# Patient Record
Sex: FEMALE | Employment: FULL TIME | URBAN - METROPOLITAN AREA
[De-identification: names, ages, dates, MRNs, and addresses within clinical notes are randomized per-mention and may not be internally consistent; named-entity substitution may affect disease eponyms.]

---

## 2022-11-16 ENCOUNTER — APPOINTMENT (OUTPATIENT)
Dept: LAB | Facility: CLINIC | Age: 32
End: 2022-11-16

## 2022-11-16 DIAGNOSIS — Z36.89 ENCOUNTER FOR OTHER SPECIFIED ANTENATAL SCREENING: ICD-10-CM

## 2022-11-16 DIAGNOSIS — Z31.430 ENCNTR FEM FOR TEST FOR GENETC DIS CARRIER STAT FOR PRO MGMT: ICD-10-CM

## 2022-11-16 LAB
BASOPHILS # BLD AUTO: 0.03 THOUSANDS/ÂΜL (ref 0–0.1)
BASOPHILS NFR BLD AUTO: 1 % (ref 0–1)
EOSINOPHIL # BLD AUTO: 0.02 THOUSAND/ÂΜL (ref 0–0.61)
EOSINOPHIL NFR BLD AUTO: 0 % (ref 0–6)
ERYTHROCYTE [DISTWIDTH] IN BLOOD BY AUTOMATED COUNT: 11.6 % (ref 11.6–15.1)
GLUCOSE SERPL-MCNC: 82 MG/DL (ref 65–140)
HBV SURFACE AG SER QL: NORMAL
HCT VFR BLD AUTO: 36.9 % (ref 34.8–46.1)
HCV AB SER QL: NORMAL
HGB BLD-MCNC: 12.1 G/DL (ref 11.5–15.4)
IMM GRANULOCYTES # BLD AUTO: 0.01 THOUSAND/UL (ref 0–0.2)
IMM GRANULOCYTES NFR BLD AUTO: 0 % (ref 0–2)
LYMPHOCYTES # BLD AUTO: 1.07 THOUSANDS/ÂΜL (ref 0.6–4.47)
LYMPHOCYTES NFR BLD AUTO: 23 % (ref 14–44)
MCH RBC QN AUTO: 27.6 PG (ref 26.8–34.3)
MCHC RBC AUTO-ENTMCNC: 32.8 G/DL (ref 31.4–37.4)
MCV RBC AUTO: 84 FL (ref 82–98)
MONOCYTES # BLD AUTO: 0.22 THOUSAND/ÂΜL (ref 0.17–1.22)
MONOCYTES NFR BLD AUTO: 5 % (ref 4–12)
NEUTROPHILS # BLD AUTO: 3.32 THOUSANDS/ÂΜL (ref 1.85–7.62)
NEUTS SEG NFR BLD AUTO: 71 % (ref 43–75)
NRBC BLD AUTO-RTO: 0 /100 WBCS
PLATELET # BLD AUTO: 262 THOUSANDS/UL (ref 149–390)
PMV BLD AUTO: 10.6 FL (ref 8.9–12.7)
RBC # BLD AUTO: 4.39 MILLION/UL (ref 3.81–5.12)
RUBV IGG SERPL IA-ACNC: >175 IU/ML
VZV IGG SER QL IA: NORMAL
WBC # BLD AUTO: 4.67 THOUSAND/UL (ref 4.31–10.16)

## 2022-11-17 LAB
HIV 1+2 AB+HIV1 P24 AG SERPL QL IA: NORMAL
RPR SER QL: NORMAL

## 2022-11-18 LAB
BACTERIA UR CULT: ABNORMAL
BACTERIA UR CULT: ABNORMAL

## 2022-11-22 LAB
CLINICAL INFO: NORMAL
ETHNIC BACKGROUND STATED: NORMAL
GENE MUT TESTED BLD/T: NORMAL
GENERAL COMMENTS:: NORMAL
LAB DIRECTOR NAME PROVIDER: NORMAL
REASON FOR REFERRAL (NARRATIVE): NORMAL
REF LAB TEST METHOD: NORMAL
SL AMB DISCLAIMER: NORMAL
SL AMB GENETIC COUNSELOR: NORMAL
SMN1 GENE MUT ANL BLD/T: NORMAL
SPECIMEN SOURCE: NORMAL

## 2022-11-23 LAB
EXTERNAL CHLAMYDIA SCREEN: NEGATIVE
EXTERNAL GONORRHEA SCREEN: NEGATIVE

## 2022-11-25 LAB
CF COMMENT: NORMAL
CFTR MUT ANL BLD/T: NORMAL
COMMENTX: (FRAGILE X): NORMAL
FMR1 GENE CGG RPT BLD/T QL: NORMAL

## 2022-12-20 LAB — EXTERNAL AFP: NORMAL

## 2023-03-15 LAB — GLUCOSE 1H P 50 G GLC PO SERPL-MCNC: 139 MG/DL (ref 70–183)

## 2023-05-30 ENCOUNTER — HOSPITAL ENCOUNTER (INPATIENT)
Facility: HOSPITAL | Age: 33
LOS: 3 days | Discharge: HOME | End: 2023-06-02
Attending: OBSTETRICS & GYNECOLOGY | Admitting: OBSTETRICS & GYNECOLOGY
Payer: COMMERCIAL

## 2023-05-30 ENCOUNTER — ANESTHESIA EVENT (INPATIENT)
Dept: LABOR AND DELIVERY | Facility: HOSPITAL | Age: 33
End: 2023-05-30

## 2023-05-30 ENCOUNTER — ANESTHESIA (INPATIENT)
Dept: LABOR AND DELIVERY | Facility: HOSPITAL | Age: 33
End: 2023-05-30

## 2023-05-30 DIAGNOSIS — Z98.891 S/P PRIMARY LOW TRANSVERSE C-SECTION: Primary | ICD-10-CM

## 2023-05-30 PROBLEM — Z3A.39 39 WEEKS GESTATION OF PREGNANCY: Status: ACTIVE | Noted: 2023-05-30

## 2023-05-30 PROBLEM — O42.92 FULL-TERM PREMATURE RUPTURE OF MEMBRANES: Status: ACTIVE | Noted: 2023-05-30

## 2023-05-30 PROBLEM — F41.9 ANXIETY: Status: ACTIVE | Noted: 2023-05-30

## 2023-05-30 PROBLEM — N80.9 ENDOMETRIOSIS: Status: ACTIVE | Noted: 2023-05-30

## 2023-05-30 LAB
A1 MICROGLOB PLACENTAL VAG QL: POSITIVE
ABO GROUP BLD: NORMAL
BLD GP AB SCN SERPL QL: NEGATIVE
ERYTHROCYTE [DISTWIDTH] IN BLOOD BY AUTOMATED COUNT: 13.4 % (ref 11.6–15.1)
HCT VFR BLD AUTO: 36.5 % (ref 34.8–46.1)
HGB BLD-MCNC: 11.7 G/DL (ref 11.5–15.4)
HOLD SPECIMEN: NORMAL
MCH RBC QN AUTO: 27.3 PG (ref 26.8–34.3)
MCHC RBC AUTO-ENTMCNC: 32.1 G/DL (ref 31.4–37.4)
MCV RBC AUTO: 85 FL (ref 82–98)
PLATELET # BLD AUTO: 228 THOUSANDS/UL (ref 149–390)
PMV BLD AUTO: 10.2 FL (ref 8.9–12.7)
RBC # BLD AUTO: 4.28 MILLION/UL (ref 3.81–5.12)
RH BLD: POSITIVE
SPECIMEN EXPIRATION DATE: NORMAL
TREPONEMA PALLIDUM IGG+IGM AB [PRESENCE] IN SERUM OR PLASMA BY IMMUNOASSAY: NORMAL
WBC # BLD AUTO: 10.49 THOUSAND/UL (ref 4.31–10.16)

## 2023-05-30 PROCEDURE — 86900 BLOOD TYPING SEROLOGIC ABO: CPT

## 2023-05-30 PROCEDURE — 3E033VJ INTRODUCTION OF OTHER HORMONE INTO PERIPHERAL VEIN, PERCUTANEOUS APPROACH: ICD-10-PCS | Performed by: OBSTETRICS & GYNECOLOGY

## 2023-05-30 PROCEDURE — 4A1HXCZ MONITORING OF PRODUCTS OF CONCEPTION, CARDIAC RATE, EXTERNAL APPROACH: ICD-10-PCS | Performed by: OBSTETRICS & GYNECOLOGY

## 2023-05-30 PROCEDURE — NC001 PR NO CHARGE: Performed by: OBSTETRICS & GYNECOLOGY

## 2023-05-30 PROCEDURE — 86780 TREPONEMA PALLIDUM: CPT

## 2023-05-30 PROCEDURE — 86850 RBC ANTIBODY SCREEN: CPT

## 2023-05-30 PROCEDURE — 3E0DXGC INTRODUCTION OF OTHER THERAPEUTIC SUBSTANCE INTO MOUTH AND PHARYNX, EXTERNAL APPROACH: ICD-10-PCS | Performed by: OBSTETRICS & GYNECOLOGY

## 2023-05-30 PROCEDURE — 85027 COMPLETE CBC AUTOMATED: CPT

## 2023-05-30 PROCEDURE — 84112 EVAL AMNIOTIC FLUID PROTEIN: CPT

## 2023-05-30 PROCEDURE — 86901 BLOOD TYPING SEROLOGIC RH(D): CPT

## 2023-05-30 RX ORDER — BUPIVACAINE HYDROCHLORIDE 2.5 MG/ML
30 INJECTION, SOLUTION EPIDURAL; INFILTRATION; INTRACAUDAL ONCE AS NEEDED
Status: DISCONTINUED | OUTPATIENT
Start: 2023-05-30 | End: 2023-05-31

## 2023-05-30 RX ORDER — LIDOCAINE HYDROCHLORIDE AND EPINEPHRINE 15; 5 MG/ML; UG/ML
INJECTION, SOLUTION EPIDURAL AS NEEDED
Status: DISCONTINUED | OUTPATIENT
Start: 2023-05-30 | End: 2023-05-31

## 2023-05-30 RX ORDER — ONDANSETRON 2 MG/ML
4 INJECTION INTRAMUSCULAR; INTRAVENOUS EVERY 6 HOURS PRN
Status: DISCONTINUED | OUTPATIENT
Start: 2023-05-30 | End: 2023-05-31

## 2023-05-30 RX ORDER — BUTORPHANOL TARTRATE 1 MG/ML
1 INJECTION, SOLUTION INTRAMUSCULAR; INTRAVENOUS ONCE
Status: COMPLETED | OUTPATIENT
Start: 2023-05-30 | End: 2023-05-30

## 2023-05-30 RX ORDER — ROPIVACAINE HYDROCHLORIDE 2 MG/ML
INJECTION, SOLUTION EPIDURAL; INFILTRATION; PERINEURAL CONTINUOUS PRN
Status: DISCONTINUED | OUTPATIENT
Start: 2023-05-30 | End: 2023-05-31

## 2023-05-30 RX ORDER — SODIUM CHLORIDE, SODIUM LACTATE, POTASSIUM CHLORIDE, CALCIUM CHLORIDE 600; 310; 30; 20 MG/100ML; MG/100ML; MG/100ML; MG/100ML
125 INJECTION, SOLUTION INTRAVENOUS CONTINUOUS
Status: DISCONTINUED | OUTPATIENT
Start: 2023-05-30 | End: 2023-06-02 | Stop reason: HOSPADM

## 2023-05-30 RX ORDER — OXYTOCIN/RINGER'S LACTATE 30/500 ML
1-30 PLASTIC BAG, INJECTION (ML) INTRAVENOUS
Status: DISCONTINUED | OUTPATIENT
Start: 2023-05-30 | End: 2023-05-31

## 2023-05-30 RX ORDER — ROPIVACAINE HYDROCHLORIDE 5 MG/ML
INJECTION, SOLUTION EPIDURAL; INFILTRATION; PERINEURAL AS NEEDED
Status: DISCONTINUED | OUTPATIENT
Start: 2023-05-30 | End: 2023-05-31

## 2023-05-30 RX ADMIN — ROPIVACAINE HYDROCHLORIDE: 2 INJECTION, SOLUTION EPIDURAL; INFILTRATION at 19:00

## 2023-05-30 RX ADMIN — LIDOCAINE HYDROCHLORIDE AND EPINEPHRINE 3 ML: 15; 5 INJECTION, SOLUTION EPIDURAL at 18:55

## 2023-05-30 RX ADMIN — SODIUM CHLORIDE, SODIUM LACTATE, POTASSIUM CHLORIDE, AND CALCIUM CHLORIDE 125 ML/HR: .6; .31; .03; .02 INJECTION, SOLUTION INTRAVENOUS at 21:39

## 2023-05-30 RX ADMIN — ROPIVACAINE HYDROCHLORIDE: 2 INJECTION, SOLUTION EPIDURAL; INFILTRATION at 21:41

## 2023-05-30 RX ADMIN — ONDANSETRON 4 MG: 2 INJECTION INTRAMUSCULAR; INTRAVENOUS at 19:45

## 2023-05-30 RX ADMIN — ROPIVACAINE HYDROCHLORIDE 8 ML/HR: 2 INJECTION, SOLUTION EPIDURAL; INFILTRATION at 18:57

## 2023-05-30 RX ADMIN — SODIUM CHLORIDE, SODIUM LACTATE, POTASSIUM CHLORIDE, AND CALCIUM CHLORIDE 125 ML/HR: .6; .31; .03; .02 INJECTION, SOLUTION INTRAVENOUS at 18:27

## 2023-05-30 RX ADMIN — BUTORPHANOL TARTRATE 1 MG: 1 INJECTION, SOLUTION INTRAMUSCULAR; INTRAVENOUS at 16:18

## 2023-05-30 RX ADMIN — ROPIVACAINE HYDROCHLORIDE 4 ML: 5 INJECTION EPIDURAL; INFILTRATION; PERINEURAL at 18:57

## 2023-05-30 RX ADMIN — SODIUM CHLORIDE, SODIUM LACTATE, POTASSIUM CHLORIDE, AND CALCIUM CHLORIDE 999 ML/HR: .6; .31; .03; .02 INJECTION, SOLUTION INTRAVENOUS at 17:23

## 2023-05-30 RX ADMIN — OXYTOCIN 2 MILLI-UNITS/MIN: 10 INJECTION INTRAVENOUS at 17:26

## 2023-05-30 RX ADMIN — Medication 50 MCG: at 13:19

## 2023-05-30 NOTE — OB LABOR/OXYTOCIN SAFETY PROGRESS
Oxytocin Safety Progress Check Note - Sameera Chi 28 y o  female MRN: [id number]    Unit/Bed#: L&D 322-01 Encounter: [id number]    Dose (ezequiel-units/min) Oxytocin: 4 ezequiel-units/min  Contraction Frequency (minutes): 2-5  Contraction Quality: Mild  Tachysystole: No   Cervical Dilation: 5        Cervical Effacement: 80  Fetal Station: -2  Baseline Rate: 140 bpm  Fetal Heart Rate: 150 BPM  FHR Category: Category I           Vital Signs:   Vitals:    05/30/23 1759   BP: 125/71   Pulse: 79   Resp:    Temp:        Notes/comments: Tolerating ctx better with stadol on board  Taught forebag palpated  Continue pit titration  Epidural as desired          Jia Merida DO 5/30/2023 6:08 PM

## 2023-05-30 NOTE — ASSESSMENT & PLAN NOTE
Pain regimen: scheduled Tylenol;scheduled Motrin; PRN Oxycodone  Bowel regimen: Colace BID  Voiding s/p removal of ca catheter  Encourage ambulation and breastfeeding

## 2023-05-30 NOTE — H&P
H&P Exam - Obstetrics   Sameera Chi 28 y o  female MRN: [id number]  Unit/Bed#: L&D 322-01 Encounter: [id number]      ASSESSMENT:  28 y  o yo  at 39w1d weeks gestation who is being admitted for PROM   EFW: 7lbs  VTX by transabdominal ultrasound    PLAN:    Full-term premature rupture of membranes  Assessment & Plan  PROM at [address] on 23  Equivocal exam: negative nitrazine, ferning and pooling  ROSAURA of 8 77cm  Amnisure positive  Admitted for induction of labor  Anxiety  Assessment & Plan  Stable  Previously taking zoloft and xanax, stopped in pregnancy    * 39 weeks gestation of pregnancy  Assessment & Plan  Admit to L&D  CBC, RPR, Type & Screen  SVE: 0 /-3  FHT: category I, occasional variable decelerations seen earlier on tracing  Clinical EFW: 7lbs ; Vertex confirmed by TAUS  GBS status: negative   Postpartum contraception plan: declines  Analgesia at maternal request  Plan: PO cytotec then pitocin  Discussed with Dr Corinne Cookey      This patient will be an INPATIENT  and I certify the anticipated length of stay is >2 Midnights  History of Present Illness     Chief Complaint: SROM    HPI:  Sameera Chi is a 28 y o   female with an JAYESH of 2023, by Last Menstrual Period at 39w1d weeks gestation who is being admitted for PROM  She states that at 0145 this morning she had a small gush of fluid that soaked her underwear and her bed  She then changed her clothes and sheets and had the same thing happen again  She is unsure if this was her breaking her water or not  Patient endorses vaginal spotting with mucousy discharge  She denies contractions and decreased fetal movement  Patient states her pregnancy has been uncomplicated  Contractions: irregular  Loss of fluid: yes  Vaginal bleeding: minimal w/mucous  Fetal movement: yes    She is SOLO patient       PREGNANCY COMPLICATIONS:   1) Pregnancy at 39w1d  2) Anxiety    OB History    Para Term  AB Living   1 0 0 0 0 0   SAB IAB Ectopic Multiple Live Births   0 0 0 0 0      # Outcome Date GA Lbr Satish/2nd Weight Sex Delivery Anes PTL Lv   1 Current                Baby complications/comments: none    Review of Systems   Constitutional: Negative for chills and fever  HENT: Negative for ear pain and sore throat  Eyes: Negative for pain and visual disturbance  Respiratory: Negative for cough and shortness of breath  Cardiovascular: Negative for chest pain and palpitations  Gastrointestinal: Negative for abdominal pain and vomiting  Genitourinary: Positive for vaginal bleeding and vaginal discharge  Negative for dysuria and hematuria  Musculoskeletal: Negative for arthralgias and back pain  Skin: Negative for color change and rash  Neurological: Negative for seizures and syncope  All other systems reviewed and are negative  Historical Information   Past Medical History:   Diagnosis Date   • Anxiety     previously needing medications   • Endometriosis    • Stomach ulcer 2013     Past Surgical History:   Procedure Laterality Date   • DIAGNOSTIC LAPAROSCOPY      age 12 for endometriosis     Social History   Social History     Substance and Sexual Activity   Alcohol Use Yes    Comment: social     Social History     Substance and Sexual Activity   Drug Use No     Social History     Tobacco Use   Smoking Status Never   Smokeless Tobacco Never     Family History: non-contributory    Meds/Allergies      Medications Prior to Admission   Medication   • ALPRAZolam (XANAX) 0 25 mg tablet   • sertraline (ZOLOFT) 50 mg tablet      No Known Allergies    OBJECTIVE:    Vitals: Last menstrual period 08/29/2022  There is no height or weight on file to calculate BMI  BP: 120s/70s    Physical Exam  Vitals reviewed  Constitutional:       Appearance: Normal appearance  HENT:      Head: Normocephalic and atraumatic  Eyes:      Extraocular Movements: Extraocular movements intact     Cardiovascular:      Rate and Rhythm: "Normal rate  Pulses: Normal pulses  Pulmonary:      Effort: Pulmonary effort is normal  No respiratory distress  Abdominal:      Palpations: Abdomen is soft  Tenderness: There is no abdominal tenderness  Comments: Gravid uterus   Genitourinary:     General: Normal vulva  Musculoskeletal:         General: Normal range of motion  Cervical back: Normal range of motion  Skin:     General: Skin is warm and dry  Neurological:      Mental Status: She is alert  Psychiatric:         Mood and Affect: Mood normal          Behavior: Behavior normal          Pooling: negative  Ferning: negative  Nitrazine: positive  Speculum: blood seen on speculum exam  ROSAURA: 8 77cm  Amnisure: positive    Cervix:  Cervical Dilation: Fingertip  Cervical Effacement: 50  Cervical Consistency: Medium  Fetal Station: -3  Presentation: Vertex  Method: Manual  OB Examiner: Delores    Fetal heart rate:   Baseline Rate: 145 bpm  Variability: Moderate 6-25 bpm  Accelerations: 15 x 15 or greater, At variable times    Lyndon:   Contraction Frequency (minutes): irregular with irritability  Contraction Duration (seconds): 40-90  Contraction Quality: Mild    GBS: negative    Prenatal Labs: I have personally reviewed pertinent reports    , Blood Type:   Lab Results   Component Value Date/Time    ABO Grouping O 10/10/2022 08:19 AM     , D (Rh type):   Lab Results   Component Value Date/Time    Rh Factor Positive 10/10/2022 08:19 AM     , Antibody Screen: No results found for: \"ANTIBODYSCR\" , HCT/HGB:   Lab Results   Component Value Date/Time    Hematocrit 36 9 11/16/2022 07:13 AM    Hemoglobin 12 1 11/16/2022 07:13 AM      , MCV:   Lab Results   Component Value Date/Time    MCV 84 11/16/2022 07:13 AM      , Platelets:   Lab Results   Component Value Date/Time    Platelets 225 22/29/0252 07:13 AM      , 1 hour Glucola:   Lab Results   Component Value Date/Time    Glucose 139 03/15/2023 12:00 AM   , 3 hour GTT:   Lab Results " "  Component Value Date/Time    Glucose, GTT - 3 Hour 115 03/22/2023 11:00 AM   , Varicella:   Lab Results   Component Value Date/Time    Varicella IgG IMMUNE 11/16/2022 07:13 AM       , Rubella:   Lab Results   Component Value Date/Time    Rubella IgG Quant >175 0 11/16/2022 07:13 AM        , VDRL/RPR:   Lab Results   Component Value Date/Time    RPR Non-Reactive 11/16/2022 07:13 AM      , Urine Culture/Screen:   Lab Results   Component Value Date/Time    Urine Culture 30,000-39,000 cfu/ml Lactobacillus species (A) 11/16/2022 07:13 AM    Urine Culture <10,000 cfu/ml 11/16/2022 07:13 AM       , Urine Drug Screen: No results found for: \"AMPHETUR\", \"BARBTUR\", \"BDZUR\", \"COCAINEUR\", \"ECSTASYUR\", \"METHADONEUR\", \"MTHAMUR\", \"OPIATEUR\", \"PCPUR\", \"THCUR\", \"TRICYCLICSUR\", Hep B:   Lab Results   Component Value Date/Time    Hepatitis B Surface Ag Non-reactive 11/16/2022 07:13 AM     , Hep C: No components found for: \"EXTHEPCSAG\", \"HEPCSAG\"   , HIV:   Lab Results   Component Value Date/Time    HIV-1/HIV-2 Ab Non-Reactive 11/16/2022 07:13 AM     , Chlamydia:   Lab Results   Component Value Date/Time    External Chlamydia Screen Negative 11/23/2022 12:00 AM     , Gonorrhea: No results found for: \"LABNGO\"  , Group B Strep:    Lab Results   Component Value Date/Time    Strep Grp B PCR Negative 05/09/2023 01:44 PM          Invasive Devices     None                 Clayton Bajwa MD  OBGYN PGY-2  5/30/2023  10:32 AM  "

## 2023-05-30 NOTE — OB LABOR/OXYTOCIN SAFETY PROGRESS
Labor Progress Note - Torito Brick 28 y o  female MRN: [id number]    Unit/Bed#: L&D 322-01 Encounter: [id number]       Contraction Frequency (minutes): 2-5  Contraction Quality: Mild  Tachysystole: No   Cervical Dilation: 2        Cervical Effacement: 50  Fetal Station: -3  Baseline Rate: 140 bpm  Fetal Heart Rate: 150 BPM  FHR Category: Category I               Vital Signs:   Vitals:    05/30/23 1420   BP: 119/66   Pulse: 73   Resp:    Temp:        Notes/comments:   Evaluated patient 4 hours after cytotec placed  Dawkins balloon in cervical canal, external os now 2cm dilated  Patient received stadol for analgesia  Contractions too frequent for repeat dose cytotec, will plan to start pitocin to continue induction of labor       Discussed with Dr Dayo Blake MD 5/30/2023 4:32 PM

## 2023-05-30 NOTE — OB LABOR/OXYTOCIN SAFETY PROGRESS
Labor Progress Note - Mendoza Frazier 28 y o  female MRN: [id number]    Unit/Bed#: L&D 322-01 Encounter: [id number]       Contraction Frequency (minutes): irritability  Contraction Quality: Mild  Tachysystole: No   Cervical Dilation: 1        Cervical Effacement: 50  Fetal Station: -3  Baseline Rate: 145 bpm  Fetal Heart Rate: 150 BPM   Category II               Vital Signs:   Vitals:    05/30/23 1420   BP: 119/66   Pulse: 73   Resp:    Temp:        Notes/comments:   Evaluated patient after several hours  Starting to have some cramping and discomfort  Discussed with patient risks and benefits of ca balloon placement, including theoretical risk of infection from insertion, but theoretical decreased risk of infection from shortened labor course  Patient is amenable to placement  Ca balloon placed, cervix now 1/50/-3  Category II FHT with occasional variable decelerations  Will continue to monitor      Discussed with Dr Winter Kent MD 5/30/2023 2:46 PM

## 2023-05-30 NOTE — ANESTHESIA PREPROCEDURE EVALUATION
Procedure:  LABOR ANALGESIA    Relevant Problems   GYN   (+) 39 weeks gestation of pregnancy      NEURO/PSYCH   (+) Anxiety      Other   (+) Endometriosis   (+) Full-term premature rupture of membranes        Physical Exam    Airway    Mallampati score: II         Dental   No notable dental hx     Cardiovascular  Cardiovascular exam normal    Pulmonary  Pulmonary exam normal     Other Findings        Anesthesia Plan  ASA Score- 2     Anesthesia Type- epidural with ASA Monitors  Additional Monitors:   Airway Plan:           Plan Factors-Exercise tolerance (METS): >4 METS  Chart reviewed  Existing labs reviewed  Patient is not a current smoker  Induction-     Postoperative Plan-     Informed Consent- Anesthetic plan and risks discussed with patient

## 2023-05-30 NOTE — ANESTHESIA PROCEDURE NOTES
Epidural Block    Patient location during procedure: OB  Start time: 5/30/2023 6:55 PM  Reason for block: primary anesthetic  Staffing  Performed by: Shannen Sanchez MD  Authorized by: Shannen Sanchez MD    Preanesthetic Checklist  Completed: patient identified, IV checked, site marked, risks and benefits discussed, surgical consent, monitors and equipment checked, pre-op evaluation and timeout performed  Epidural  Patient position: sitting  Prep: Betadine  Patient monitoring: frequent blood pressure checks, continuous pulse ox and heart rate  Approach: midline  Location: lumbar  Injection technique: SETH saline  Needle  Needle type: Tuohy   Needle gauge: 18 G  Catheter type: side hole  Catheter size: 20 G  Catheter at skin depth: 12 cm  Catheter securement method: clear occlusive dressing  Test dose: negative  Assessment  Sensory level: T10  Number of attempts: 1no paresthesia on injection, negative aspiration for heme and negative aspiration for CSF  patient tolerated the procedure well with no immediate complications

## 2023-05-31 PROBLEM — O36.8390 FETAL TACHYCARDIA AFFECTING MANAGEMENT OF MOTHER: Status: ACTIVE | Noted: 2023-05-31

## 2023-05-31 LAB
BASE EXCESS BLDCOA CALC-SCNC: -4.5 MMOL/L (ref 3–11)
BASE EXCESS BLDCOV CALC-SCNC: -3.5 MMOL/L (ref 1–9)
HCO3 BLDCOA-SCNC: 22.2 MMOL/L (ref 17.3–27.3)
HCO3 BLDCOV-SCNC: 22.3 MMOL/L (ref 12.2–28.6)
O2 CT VFR BLDCOA CALC: <10 ML/DL
OXYHGB MFR BLDCOA: 9 %
OXYHGB MFR BLDCOV: 38.4 %
PCO2 BLDCOA: 46.7 MM[HG] (ref 30–60)
PCO2 BLDCOV: 42.5 MM HG (ref 27–43)
PH BLDCOA: 7.29 [PH] (ref 7.23–7.43)
PH BLDCOV: 7.34 [PH] (ref 7.19–7.49)
PO2 BLDCOA: <10 MM HG (ref 5–25)
PO2 BLDCOV: 18.8 MM HG (ref 15–45)
SAO2 % BLDCOV: 8.5 ML/DL

## 2023-05-31 PROCEDURE — NC001 PR NO CHARGE: Performed by: OBSTETRICS & GYNECOLOGY

## 2023-05-31 PROCEDURE — 94762 N-INVAS EAR/PLS OXIMTRY CONT: CPT

## 2023-05-31 PROCEDURE — 85027 COMPLETE CBC AUTOMATED: CPT | Performed by: OBSTETRICS & GYNECOLOGY

## 2023-05-31 PROCEDURE — 10H07YZ INSERTION OF OTHER DEVICE INTO PRODUCTS OF CONCEPTION, VIA NATURAL OR ARTIFICIAL OPENING: ICD-10-PCS | Performed by: OBSTETRICS & GYNECOLOGY

## 2023-05-31 PROCEDURE — 82805 BLOOD GASES W/O2 SATURATION: CPT

## 2023-05-31 PROCEDURE — 88307 TISSUE EXAM BY PATHOLOGIST: CPT | Performed by: PATHOLOGY

## 2023-05-31 RX ORDER — OXYTOCIN/RINGER'S LACTATE 30/500 ML
PLASTIC BAG, INJECTION (ML) INTRAVENOUS
Status: COMPLETED
Start: 2023-05-31 | End: 2023-05-31

## 2023-05-31 RX ORDER — MORPHINE SULFATE 0.5 MG/ML
INJECTION, SOLUTION EPIDURAL; INTRATHECAL; INTRAVENOUS AS NEEDED
Status: DISCONTINUED | OUTPATIENT
Start: 2023-05-31 | End: 2023-05-31

## 2023-05-31 RX ORDER — SODIUM CHLORIDE, SODIUM LACTATE, POTASSIUM CHLORIDE, CALCIUM CHLORIDE 600; 310; 30; 20 MG/100ML; MG/100ML; MG/100ML; MG/100ML
125 INJECTION, SOLUTION INTRAVENOUS CONTINUOUS
Status: DISCONTINUED | OUTPATIENT
Start: 2023-05-31 | End: 2023-06-02 | Stop reason: HOSPADM

## 2023-05-31 RX ORDER — IBUPROFEN 600 MG/1
600 TABLET ORAL EVERY 6 HOURS
Status: DISCONTINUED | OUTPATIENT
Start: 2023-06-02 | End: 2023-06-02 | Stop reason: HOSPADM

## 2023-05-31 RX ORDER — NALBUPHINE HYDROCHLORIDE 10 MG/ML
5 INJECTION, SOLUTION INTRAMUSCULAR; INTRAVENOUS; SUBCUTANEOUS
Status: ACTIVE | OUTPATIENT
Start: 2023-05-31 | End: 2023-06-01

## 2023-05-31 RX ORDER — OXYTOCIN/RINGER'S LACTATE 30/500 ML
62.5 PLASTIC BAG, INJECTION (ML) INTRAVENOUS ONCE
Status: COMPLETED | OUTPATIENT
Start: 2023-05-31 | End: 2023-05-31

## 2023-05-31 RX ORDER — NALOXONE HYDROCHLORIDE 0.4 MG/ML
0.1 INJECTION, SOLUTION INTRAMUSCULAR; INTRAVENOUS; SUBCUTANEOUS
Status: ACTIVE | OUTPATIENT
Start: 2023-05-31 | End: 2023-06-01

## 2023-05-31 RX ORDER — OXYCODONE HYDROCHLORIDE 5 MG/1
5 TABLET ORAL EVERY 4 HOURS PRN
Status: DISCONTINUED | OUTPATIENT
Start: 2023-05-31 | End: 2023-06-02 | Stop reason: HOSPADM

## 2023-05-31 RX ORDER — DOCUSATE SODIUM 100 MG/1
100 CAPSULE, LIQUID FILLED ORAL 2 TIMES DAILY
Status: DISCONTINUED | OUTPATIENT
Start: 2023-05-31 | End: 2023-06-02 | Stop reason: HOSPADM

## 2023-05-31 RX ORDER — ACETAMINOPHEN 325 MG/1
650 TABLET ORAL EVERY 6 HOURS SCHEDULED
Status: DISCONTINUED | OUTPATIENT
Start: 2023-05-31 | End: 2023-06-02 | Stop reason: HOSPADM

## 2023-05-31 RX ORDER — CEFAZOLIN SODIUM 1 G/3ML
INJECTION, POWDER, FOR SOLUTION INTRAMUSCULAR; INTRAVENOUS AS NEEDED
Status: DISCONTINUED | OUTPATIENT
Start: 2023-05-31 | End: 2023-05-31

## 2023-05-31 RX ORDER — LIDOCAINE HYDROCHLORIDE AND EPINEPHRINE 20; 5 MG/ML; UG/ML
INJECTION, SOLUTION EPIDURAL; INFILTRATION; INTRACAUDAL; PERINEURAL
Status: COMPLETED
Start: 2023-05-31 | End: 2023-05-31

## 2023-05-31 RX ORDER — CALCIUM CARBONATE 500 MG/1
1000 TABLET, CHEWABLE ORAL DAILY PRN
Status: DISCONTINUED | OUTPATIENT
Start: 2023-05-31 | End: 2023-06-02 | Stop reason: HOSPADM

## 2023-05-31 RX ORDER — ONDANSETRON 2 MG/ML
4 INJECTION INTRAMUSCULAR; INTRAVENOUS EVERY 8 HOURS PRN
Status: DISCONTINUED | OUTPATIENT
Start: 2023-05-31 | End: 2023-06-02 | Stop reason: HOSPADM

## 2023-05-31 RX ORDER — MORPHINE SULFATE 0.5 MG/ML
INJECTION, SOLUTION EPIDURAL; INTRATHECAL; INTRAVENOUS
Status: COMPLETED
Start: 2023-05-31 | End: 2023-05-31

## 2023-05-31 RX ORDER — LIDOCAINE HYDROCHLORIDE AND EPINEPHRINE 20; 5 MG/ML; UG/ML
INJECTION, SOLUTION EPIDURAL; INFILTRATION; INTRACAUDAL; PERINEURAL AS NEEDED
Status: DISCONTINUED | OUTPATIENT
Start: 2023-05-31 | End: 2023-05-31

## 2023-05-31 RX ORDER — KETOROLAC TROMETHAMINE 30 MG/ML
30 INJECTION, SOLUTION INTRAMUSCULAR; INTRAVENOUS EVERY 6 HOURS SCHEDULED
Status: COMPLETED | OUTPATIENT
Start: 2023-05-31 | End: 2023-06-01

## 2023-05-31 RX ORDER — CEFAZOLIN SODIUM 2 G/50ML
2000 SOLUTION INTRAVENOUS ONCE
Status: DISCONTINUED | OUTPATIENT
Start: 2023-05-31 | End: 2023-06-02 | Stop reason: HOSPADM

## 2023-05-31 RX ORDER — ONDANSETRON 2 MG/ML
INJECTION INTRAMUSCULAR; INTRAVENOUS
Status: COMPLETED
Start: 2023-05-31 | End: 2023-05-31

## 2023-05-31 RX ORDER — LIDOCAINE HYDROCHLORIDE AND EPINEPHRINE 20; 5 MG/ML; UG/ML
INJECTION, SOLUTION EPIDURAL; INFILTRATION; INTRACAUDAL; PERINEURAL
Status: DISCONTINUED
Start: 2023-05-31 | End: 2023-05-31 | Stop reason: WASHOUT

## 2023-05-31 RX ORDER — SIMETHICONE 80 MG
80 TABLET,CHEWABLE ORAL EVERY 6 HOURS PRN
Status: DISCONTINUED | OUTPATIENT
Start: 2023-05-31 | End: 2023-06-02 | Stop reason: HOSPADM

## 2023-05-31 RX ORDER — ONDANSETRON 2 MG/ML
INJECTION INTRAMUSCULAR; INTRAVENOUS AS NEEDED
Status: DISCONTINUED | OUTPATIENT
Start: 2023-05-31 | End: 2023-05-31

## 2023-05-31 RX ORDER — ONDANSETRON 2 MG/ML
4 INJECTION INTRAMUSCULAR; INTRAVENOUS EVERY 6 HOURS PRN
Status: ACTIVE | OUTPATIENT
Start: 2023-05-31 | End: 2023-06-01

## 2023-05-31 RX ORDER — OXYCODONE HYDROCHLORIDE 5 MG/1
10 TABLET ORAL EVERY 4 HOURS PRN
Status: DISCONTINUED | OUTPATIENT
Start: 2023-05-31 | End: 2023-06-02 | Stop reason: HOSPADM

## 2023-05-31 RX ORDER — CEFAZOLIN SODIUM 1 G/3ML
INJECTION, POWDER, FOR SOLUTION INTRAMUSCULAR; INTRAVENOUS
Status: COMPLETED
Start: 2023-05-31 | End: 2023-05-31

## 2023-05-31 RX ORDER — ACETAMINOPHEN 325 MG/1
650 TABLET ORAL EVERY 6 HOURS SCHEDULED
Status: DISCONTINUED | OUTPATIENT
Start: 2023-05-31 | End: 2023-05-31 | Stop reason: SDUPTHER

## 2023-05-31 RX ORDER — TERBUTALINE SULFATE 1 MG/ML
INJECTION, SOLUTION SUBCUTANEOUS
Status: COMPLETED
Start: 2023-05-31 | End: 2023-05-31

## 2023-05-31 RX ORDER — PHENYLEPHRINE HYDROCHLORIDE 10 MG/ML
INJECTION INTRAVENOUS
Status: DISCONTINUED
Start: 2023-05-31 | End: 2023-05-31 | Stop reason: WASHOUT

## 2023-05-31 RX ADMIN — ONDANSETRON 4 MG: 2 INJECTION INTRAMUSCULAR; INTRAVENOUS at 11:19

## 2023-05-31 RX ADMIN — LIDOCAINE HYDROCHLORIDE AND EPINEPHRINE 10 ML: 20; 5 INJECTION, SOLUTION EPIDURAL; INFILTRATION; INTRACAUDAL; PERINEURAL at 10:41

## 2023-05-31 RX ADMIN — KETOROLAC TROMETHAMINE 30 MG: 30 INJECTION, SOLUTION INTRAMUSCULAR; INTRAVENOUS at 18:27

## 2023-05-31 RX ADMIN — DOCUSATE SODIUM 100 MG: 100 CAPSULE, LIQUID FILLED ORAL at 18:28

## 2023-05-31 RX ADMIN — SODIUM CHLORIDE, SODIUM LACTATE, POTASSIUM CHLORIDE, AND CALCIUM CHLORIDE 999 ML/HR: .6; .31; .03; .02 INJECTION, SOLUTION INTRAVENOUS at 00:51

## 2023-05-31 RX ADMIN — ACETAMINOPHEN 650 MG: 325 TABLET ORAL at 20:57

## 2023-05-31 RX ADMIN — MORPHINE SULFATE 2.5 MG: 0.5 INJECTION, SOLUTION EPIDURAL; INTRATHECAL; INTRAVENOUS at 11:08

## 2023-05-31 RX ADMIN — AZITHROMYCIN 500 MG: 500 INJECTION, POWDER, LYOPHILIZED, FOR SOLUTION INTRAVENOUS at 10:28

## 2023-05-31 RX ADMIN — ONDANSETRON 4 MG: 2 INJECTION INTRAMUSCULAR; INTRAVENOUS at 08:02

## 2023-05-31 RX ADMIN — SODIUM CHLORIDE, SODIUM LACTATE, POTASSIUM CHLORIDE, AND CALCIUM CHLORIDE 125 ML/HR: .6; .31; .03; .02 INJECTION, SOLUTION INTRAVENOUS at 07:17

## 2023-05-31 RX ADMIN — ROPIVACAINE HYDROCHLORIDE: 2 INJECTION, SOLUTION EPIDURAL; INFILTRATION at 07:16

## 2023-05-31 RX ADMIN — ONDANSETRON 4 MG: 2 INJECTION INTRAMUSCULAR; INTRAVENOUS at 19:50

## 2023-05-31 RX ADMIN — KETOROLAC TROMETHAMINE 30 MG: 30 INJECTION, SOLUTION INTRAMUSCULAR; INTRAVENOUS at 13:28

## 2023-05-31 RX ADMIN — CEFAZOLIN 1000 MG: 1 INJECTION, POWDER, FOR SOLUTION INTRAMUSCULAR; INTRAVENOUS at 10:59

## 2023-05-31 RX ADMIN — OXYTOCIN 62.5 MILLI-UNITS/MIN: 10 INJECTION INTRAVENOUS at 13:37

## 2023-05-31 RX ADMIN — SODIUM CHLORIDE, SODIUM LACTATE, POTASSIUM CHLORIDE, AND CALCIUM CHLORIDE 999 ML/HR: .6; .31; .03; .02 INJECTION, SOLUTION INTRAVENOUS at 04:53

## 2023-05-31 RX ADMIN — ROPIVACAINE HYDROCHLORIDE 100 ML: 2 INJECTION, SOLUTION EPIDURAL; INFILTRATION at 07:17

## 2023-05-31 RX ADMIN — Medication 62.5 MILLI-UNITS/MIN: at 13:37

## 2023-05-31 RX ADMIN — TERBUTALINE SULFATE 0.25 MG: 1 INJECTION SUBCUTANEOUS at 00:16

## 2023-05-31 RX ADMIN — SODIUM CHLORIDE, SODIUM LACTATE, POTASSIUM CHLORIDE, AND CALCIUM CHLORIDE 125 ML/HR: .6; .31; .03; .02 INJECTION, SOLUTION INTRAVENOUS at 13:38

## 2023-05-31 NOTE — ANESTHESIA POSTPROCEDURE EVALUATION
"Post-Op Assessment Note    CV Status:  Stable    Pain management: adequate     Mental Status:  Alert and awake   Hydration Status:  Euvolemic   PONV Controlled:  Controlled   Airway Patency:  Patent      Post Op Vitals Reviewed: Yes      Staff: Anesthesiologist     Post-op block assessment: catheter intact and no complications      No notable events documented      BP      Temp      Pulse     Resp      SpO2      /57 (BP Location: Right arm)   Pulse 75   Temp 98 7 °F (37 1 °C)   Resp 18   Ht 5' 2\" (1 575 m)   Wt 75 3 kg (166 lb)   LMP 08/29/2022   SpO2 98%   Breastfeeding Yes   BMI 30 36 kg/m²     "

## 2023-05-31 NOTE — DISCHARGE SUMMARY
Discharge Summary - Dale Arguelles 28 y o  female MRN: [id number]    Unit/Bed#: L&D 311-01 Encounter: [id number]    Admission Date: 2023     Discharge Date: 2023    Admission and Delivering Attending: Regulo Spear DO  Discharge Attending: Lizzy Blancas DO    Admission Diagnosis: Abdominal pain affecting pregnancy [O26 899, R10 9], Pregnancy at 39w2d; prelabor SROM at 39 wks    Discharge Diagnosis:  S/p primary LTCS     Procedures: primary  section, low transverse incision after failed vacuum delivery    Anesthesia: epidural    Complications: none apparent    History of Hospital Stay:  Dale Arguelles is a 28 y o   with an JAYESH of 2023, by Last Menstrual Period at 39w2d weeks gestation who was admitted for prelabor SROM on 2023  She delivered by primary  section, low transverse incision on 23  Full description of her delivery course can be found separately on the patient's chart  Her postoperative course was unremarkable  She was ambulating well, tolerating a regular diet, voiding and passing flatus without difficulty, bonding well with her , and breastfeeding with success prior to discharge on post-operative day #2  She will follow-up at [facility] for an incision check in 1-2 weeks and her postpartum visit in 6 weeks  Condition at discharge: good     Disposition: Home    Planned Readmission: No    Discharge instructions/Information to patient and family:   See after visit summary for information provided to patient and family  Discharge Medications:  See after visit summary for reconciled discharge medications provided to patient and family  Provisions for Follow-Up Care:  See after visit summary for information related to follow-up care and any pertinent home health orders  Discharge Statement   I spent 15 minutes discharging the patient  This time was spent on the day of discharge   I had direct contact with the patient on the day of discharge

## 2023-05-31 NOTE — PROGRESS NOTES
Called to patient's room at approximately 12:13 AM secondary to fetal decel to the 70s  When out of the room the patient was in knee-chest position and fetal heart tones were being obtained by Doppler  FC had fallen out  Exam was done which was 6/90/0  There will be increased bloody show  Pitocin was shut off and oxygen was administered via nonrebreather facemask  Her fluids were also increased  After total of 8 minutes fetal heart tones returned to baseline of 140s to 150s  There is good variability  A fetal scalp electrode was replaced

## 2023-05-31 NOTE — OB LABOR/OXYTOCIN SAFETY PROGRESS
Oxytocin Safety Progress Check Note - Murlene Mater 28 y o  female MRN: [id number]    Unit/Bed#: L&D 322-01 Encounter: [id number]    Dose (ezequiel-units/min) Oxytocin: 8 ezequiel-units/min  Contraction Frequency (minutes): 1 5-4 5  Contraction Quality: Mild  Tachysystole: No   Cervical Dilation: 8        Cervical Effacement: 90  Fetal Station: 1  Baseline Rate: 165 bpm  Fetal Heart Rate: 150 BPM  FHR Category: Category I               Vital Signs:   Vitals:    05/31/23 0600   BP:    Pulse:    Resp:    Temp: 99 4 °F (37 4 °C)   SpO2:        Notes/comments: Patient requested checked due to feeling more pressure however minimal change, has not yet been febrile, fetal heart tracing has been category 1 with excellent response to scalp stimulation, fetal head feels OT so will place patient back on peanut ball and continue to increase Pitocin as able        Ani Rodríguez MD 5/31/2023 6:06 AM

## 2023-05-31 NOTE — OB LABOR/OXYTOCIN SAFETY PROGRESS
Oxytocin Safety Progress Check Note - Oscar Garber 28 y o  female MRN: [id number]    Unit/Bed#: L&D 322-01 Encounter: [id number]    Dose (ezequiel-units/min) Oxytocin: 0 ezequiel-units/min  Contraction Frequency (minutes): 2-3  Contraction Quality: Moderate  Tachysystole: No   Cervical Dilation: 6        Cervical Effacement: 90  Fetal Station: -1  Baseline Rate: 140 bpm  Fetal Heart Rate: 150 BPM  FHR Category: Category I               Vital Signs:   Vitals:    05/30/23 2008   BP:    Pulse: 92   Resp:    Temp:    SpO2: 97%       Notes/comments: patient now comfortable with epidural, SVE as above, forebag ruptured followed by a prolonged deceleration lasting about 5-6 min with a marlene in the 70s, pitocin turned off and FSE placed with return to baseline and return to moderate variability, switched back to external FHT due to FSE artifact, will hold pitocin until cat 1 for at least 30 min         Brett Watts MD 5/30/2023 8:28 PM

## 2023-05-31 NOTE — OP NOTE
OPERATIVE REPORT  PATIENT NAME: Nora Key    :  1990  MRN: [id number]  Pt Location: [facility] L&D OR ROOM 01    SURGERY DATE: 2023    Surgeon(s) and Role:     * Mary Ash DO - Primary     *Norberto Rosario MD- Assistant    Preop Diagnosis:  * IUP at 39 wks    Prelabor ROM    Cat 2 FHTs- fetal tachycardia    Failed Vacuum delivery    Maternal Fever    Postop Diagnosis:  Same    Procedure(s) (LRB):   SECTION () (N/A)    Specimen(s):  Cord gases, cord blood, section of cord, placenta to pathology    Surgical QBL: 150ml    Drains:  Urethral Catheter Double-lumen;Non-latex 16 Fr  (Active)   Goal for Removal Other (Comment) 23   Site Assessment Clean;Skin intact; Patent 23   Dawkins Care Done 23   Collection Container Standard drainage bag 23 0245   Output (mL) 650 mL 23 0601   Number of days: 1     Anesthesia Type:   Epidural    Gorge Group Classification System:  No Multiple pregnancy, No Transverse or oblique lie, No Breech lie, Gestational age is > or =37 weeks, Nulliparous, Labor induced +  is GORGE GROUP 2a    Operative Findings:  Viable male at 1105 weighing 6 lbs 14 oz;  Apgar scores of 9 at one minute and 9 at five minutes  Meconium fluid  Normal uterus and bilateral ovaries and tubes  Normal placenta with 3 vessel cord  Procedure Details   Pt presented with prelabor SROM on 2023  After 8+ hours without onset of labor, cytotec was given orally for ripening followed by placement of Dawkins balloon  Once the balloon was expelled, pitocin was started  Pt had an intermittently Cat 2 FHTs and had one pitocin break  She got to complete and +2 at 0844 on 2023  She pushed on her own for one hour at which point baby was having recurrent late decels  Vacuum delivery was recommended but was unsuccessful therefore 1-LTCS was recommended  Pt was agreeable    (See labor and delivery progress notes for further information  The patient was seen prior to the procedure  Risks, benefits, possible complications, alternate treatment options, and expected outcomes were discussed with the patient  The patient agreed with the proposed plan and gave informed consent  The patient was taken to [facility] Operating Room  She had already received appropriate anesthesia  Fetal heart tones had been assessed and a Dawkins catheter and SCDs were in place  The abdomen  and vagina was prepped with Chloraprep  A fetal pillow was placed to elevate the fetal head  Following appropriate drying time, the patient was draped in the usual sterile manner  A Time Out was held and the above information confirmed  The patient was identified as Jeana Benjamin and the procedure verified as  Delivery  A Pfannenstiel incision was made and carried down through the underlying subcutaneous tissue to the fascia using a scalpel  The rectus fascia was then nicked in the midline and dissected laterally using Alvarez scissors  The superior edge of the fascial incision was grasped with Kocher clamps bilaterally, tented upward and the underlying rectus muscles were dissected off sharply with Alvarez scissors  This was repeated on the inferior edge of the fascia and dissected down to the pubic rami  The rectus muscles were  and the peritoneum was identified, entered, and extended longitudinally with blunt dissection  The Mikal-O retractor was inserted into the abdomen with care taken to avoid pressure on the intra-abdominal contents  The vesicouterine peritoneum was identified and a new scalpel was used to make a low transverse uterine incision a safe distance away from the bladder edge  The amnion was entered sharply  The uterine incision was extended bluntly bilaterally  The fetal head was palpated, elevated, and delivered through the uterine incision followed by the body without difficulty  The umbilical cord was clamped and cut    The infant was handed off to the  providers  Arterial and venous cord gases, cord blood, and a segment of umbilical cord were obtained for evaluation  The placenta delivered spontaneously with uterine fundal massage and appeared normal  The uterus was cleaned out with a moist lap sponge and left in situ for repair  The uterine incision was closed with a running locked suture of 0 Vicryl  A second layer of the same suture was used to imbricate the first   Hemostasis was noted to be excellent  The gutters were inspected and cleared of all clots and debris  The Mikal O retractor was removed  The peritoneum was reapproximated with a mattress suture of plain gut  The fascia was closed with a running suture of 0 Vicryl  Juice's layer was closed with a running suture of 2-0 Plain gut  The skin was closed with a subcuticular running suture of 4-0 Monocryl  Exofin was applied and allowed to dry  The patient appeared to tolerate the procedure very well  Sponge, needle, and instrument counts were correct x2  The patient was transferred to her postpartum recovery room in stable condition and her infant went to the  nursery  Attending Attestation: I was present for the entire procedure; no residents were available therefore Dr Brittany Auzl assisted with this case  Complications:  None; patient tolerated the procedure well             Disposition: hemodynamically stable           Condition: stable    SIGNATURE: Le Norris DO  DATE: May 31, 2023  TIME: 10:27 AM

## 2023-05-31 NOTE — PLAN OF CARE
Problem: Knowledge Deficit  Goal: Verbalizes understanding of labor plan  Description: Assess patient/family/caregiver's baseline knowledge level and ability to understand information  Provide education via patient/family/caregiver's preferred learning method at appropriate level of understanding  1  Provide teaching at level of understanding  2  Provide teaching via preferred learning method(s)  Outcome: Completed     Problem: Labor & Delivery  Goal: Manages discomfort  Description: Assess and monitor for signs and symptoms of discomfort  Assess patient's pain level regularly and per hospital policy  Administer medications as ordered  Support use of nonpharmacological methods to help control pain such as distraction, imagery, relaxation, and application of heat and cold  Collaborate with interdisciplinary team and patient to determine appropriate pain management plan  1  Include patient in decisions related to comfort  2  Offer non-pharmacological pain management interventions  3  Report ineffective pain management to physician  Outcome: Completed  Goal: Patient vital signs are stable  Description: 1  Assess vital signs - vaginal delivery    Outcome: Completed     Problem: POSTPARTUM  Goal: Experiences normal postpartum course  Description: INTERVENTIONS:  - Monitor maternal vital signs  - Assess uterine involution and lochia  Outcome: Progressing  Goal: Appropriate maternal -  bonding  Description: INTERVENTIONS:  - Identify family support  - Assess for appropriate maternal/infant bonding   -Encourage maternal/infant bonding opportunities  - Referral to  or  as needed  Outcome: Progressing  Goal: Establishment of infant feeding pattern  Description: INTERVENTIONS:  - Assess breast/bottle feeding  - Refer to lactation as needed  Outcome: Progressing  Goal: Incision(s), wounds(s) or drain site(s) healing without S/S of infection  Description: INTERVENTIONS  - Assess and document dressing, incision, wound bed, drain sites and surrounding tissue  - Provide patient and family education  - Perform skin care/dressing changes every day  Outcome: Progressing     Problem: PAIN - ADULT  Goal: Verbalizes/displays adequate comfort level or baseline comfort level  Description: Interventions:  - Encourage patient to monitor pain and request assistance  - Assess pain using appropriate pain scale  - Administer analgesics based on type and severity of pain and evaluate response  - Implement non-pharmacological measures as appropriate and evaluate response  - Consider cultural and social influences on pain and pain management  - Notify physician/advanced practitioner if interventions unsuccessful or patient reports new pain  Outcome: Progressing     Problem: INFECTION - ADULT  Goal: Absence or prevention of progression during hospitalization  Description: INTERVENTIONS:  - Assess and monitor for signs and symptoms of infection  - Monitor lab/diagnostic results  - Monitor all insertion sites, i e  indwelling lines, tubes, and drains  - Monitor endotracheal if appropriate and nasal secretions for changes in amount and color  - Strum appropriate cooling/warming therapies per order  - Administer medications as ordered  - Instruct and encourage patient and family to use good hand hygiene technique  - Identify and instruct in appropriate isolation precautions for identified infection/condition  Outcome: Progressing  Goal: Absence of fever/infection during neutropenic period  Description: INTERVENTIONS:  - Monitor WBC    Outcome: Progressing     Problem: SAFETY ADULT  Goal: Patient will remain free of falls  Description: INTERVENTIONS:  - Educate patient/family on patient safety including physical limitations  - Instruct patient to call for assistance with activity   - Consult OT/PT to assist with strengthening/mobility   - Keep Call bell within reach  - Keep bed low and locked with side rails adjusted as appropriate  - Keep care items and personal belongings within reach  - Initiate and maintain comfort rounds  Outcome: Progressing  Goal: Maintain or return to baseline ADL function  Description: INTERVENTIONS:  -  Assess patient's ability to carry out ADLs; assess patient's baseline for ADL function and identify physical deficits which impact ability to perform ADLs (bathing, care of mouth/teeth, toileting, grooming, dressing, etc )  - Assess/evaluate cause of self-care deficits   - Assess range of motion  - Assess patient's mobility; develop plan if impaired  - Assess patient's need for assistive devices and provide as appropriate  - Encourage maximum independence but intervene and supervise when necessary  - Involve family in performance of ADLs  - Assess for home care needs following discharge   - Consider OT consult to assist with ADL evaluation and planning for discharge  - Provide patient education as appropriate  Outcome: Progressing  Goal: Maintains/Returns to pre admission functional level  Description: INTERVENTIONS:  - Perform BMAT or MOVE assessment daily    - Set and communicate daily mobility goal to care team and patient/family/caregiver     - Collaborate with rehabilitation services on mobility goals if consulted  - Ambulate patient 3 times a day  - Out of bed for meals 3 times a day  - Out of bed for toileting  - Record patient progress and toleration of activity level   Outcome: Progressing     Problem: DISCHARGE PLANNING  Goal: Discharge to home or other facility with appropriate resources  Description: INTERVENTIONS:  - Identify barriers to discharge w/patient and caregiver  - Arrange for needed discharge resources and transportation as appropriate  - Identify discharge learning needs (meds, wound care, etc )  - Arrange for interpretive services to assist at discharge as needed  - Refer to Case Management Department for coordinating discharge planning if the patient needs post-hospital services based on physician/advanced practitioner order or complex needs related to functional status, cognitive ability, or social support system  Outcome: Progressing     Problem: Knowledge Deficit  Goal: Patient/family/caregiver demonstrates understanding of disease process, treatment plan, medications, and discharge instructions  Description: Complete learning assessment and assess knowledge base    Interventions:  - Provide teaching at level of understanding  - Provide teaching via preferred learning methods  Outcome: Progressing

## 2023-05-31 NOTE — OB LABOR/OXYTOCIN SAFETY PROGRESS
Oxytocin Safety Progress Check Note - Dale Cardona 28 y o  female MRN: [id number]    Unit/Bed#: L&D 322-01 Encounter: [id number]    Dose (ezequiel-units/min) Oxytocin: 4 ezequiel-units/min  Contraction Frequency (minutes): 3-4  Contraction Quality: Moderate  Tachysystole: No   Cervical Dilation: 7-8        Cervical Effacement: 90  Fetal Station: 1  Baseline Rate: 155 bpm  Fetal Heart Rate: 150 BPM  FHR Category: Category I               Vital Signs:   Vitals:    05/31/23 0420   BP: 118/58   Pulse:    Resp:    Temp: 99 5 °F (37 5 °C)   SpO2:        Notes/comments: Pitocin has been back on for 2 and half hours, patient has made some cervical change, IUPC placed for contraction monitoring and fluid bolus given, both the fetal baseline and her temperature are starting to rise, will meet criteria for suspected triple I once she becomes febrile, if/when this occurs we will start Unasyn and administer Tylenol        Kaleb Monterroso MD 5/31/2023 4:27 AM

## 2023-05-31 NOTE — QUICK NOTE
Pt was complete and started pushing at 0855  She pushed well on her own for nearly an hour to +2 station  Baby had recurrent lates with pushing along with FHTs sustained in the 170s  Offered Vacuum assisted delivery  Risks and benefits were discussed with patient and  including risk of high level vaginal lacerations, fetal scalp and skull injury, and hematomas  Pt agreeable to vacuum  Vacuum applied at +2 station at [phone]  2 pulls with pop off at 0956  Vacuum did not seem to maintain suction with minimal pressure so a second vacuum was reapplied at 1008 to JUAN M vertex  That vacuum also popped off at 1011 after 2 pulls  Further operative attempt abandoned due to maximum pop-offs  Fetal late decels have resolved  I offered opportunity for patient to continue to push on her vs  1-LTCS  She does not want to push any more and desires C/S  Anesthesia, nursing and NICU aware        Arielle Merida  05/31/23  10:26 AM

## 2023-05-31 NOTE — OB LABOR/OXYTOCIN SAFETY PROGRESS
Oxytocin Safety Progress Check Note - Prema Lezama 28 y o  female MRN: [id number]    Unit/Bed#: L&D 322-01 Encounter: [id number]    Dose (ezequiel-units/min) Oxytocin: 6 ezequiel-units/min  Contraction Frequency (minutes): 3  Contraction Quality: Moderate  Tachysystole: No   Cervical Dilation: 6        Cervical Effacement: 90  Fetal Station: -1  Baseline Rate: 150 bpm  Fetal Heart Rate: 150 BPM  FHR Category: Category I               Vital Signs:   Vitals:    05/30/23 2212   BP: 111/58   Pulse: 80   Resp:    Temp:    SpO2:        Notes/comments: Minimal cervical change, fetal presentation felt to be OT, will continue to increase Pitocin and consider IUPC placement at next check, fetal heart tracing with intermittent variable decelerations we will reposition the patient        Lacy Harman MD 5/31/2023 12:00 AM

## 2023-06-01 PROBLEM — Z98.891 S/P PRIMARY LOW TRANSVERSE C-SECTION: Status: ACTIVE | Noted: 2023-05-30

## 2023-06-01 PROBLEM — O42.92 FULL-TERM PREMATURE RUPTURE OF MEMBRANES: Status: RESOLVED | Noted: 2023-05-30 | Resolved: 2023-06-01

## 2023-06-01 LAB
ERYTHROCYTE [DISTWIDTH] IN BLOOD BY AUTOMATED COUNT: 13.4 % (ref 11.6–15.1)
HCT VFR BLD AUTO: 30.4 % (ref 34.8–46.1)
HGB BLD-MCNC: 9.7 G/DL (ref 11.5–15.4)
MCH RBC QN AUTO: 27.4 PG (ref 26.8–34.3)
MCHC RBC AUTO-ENTMCNC: 31.9 G/DL (ref 31.4–37.4)
MCV RBC AUTO: 86 FL (ref 82–98)
PLATELET # BLD AUTO: 165 THOUSANDS/UL (ref 149–390)
PMV BLD AUTO: 10.2 FL (ref 8.9–12.7)
RBC # BLD AUTO: 3.54 MILLION/UL (ref 3.81–5.12)
WBC # BLD AUTO: 15.16 THOUSAND/UL (ref 4.31–10.16)

## 2023-06-01 PROCEDURE — NC001 PR NO CHARGE: Performed by: OBSTETRICS & GYNECOLOGY

## 2023-06-01 RX ADMIN — DOCUSATE SODIUM 100 MG: 100 CAPSULE, LIQUID FILLED ORAL at 18:12

## 2023-06-01 RX ADMIN — ACETAMINOPHEN 650 MG: 325 TABLET ORAL at 22:04

## 2023-06-01 RX ADMIN — DOCUSATE SODIUM 100 MG: 100 CAPSULE, LIQUID FILLED ORAL at 08:04

## 2023-06-01 RX ADMIN — ACETAMINOPHEN 650 MG: 325 TABLET ORAL at 15:25

## 2023-06-01 RX ADMIN — KETOROLAC TROMETHAMINE 30 MG: 30 INJECTION, SOLUTION INTRAMUSCULAR; INTRAVENOUS at 00:53

## 2023-06-01 RX ADMIN — ACETAMINOPHEN 650 MG: 325 TABLET ORAL at 10:16

## 2023-06-01 RX ADMIN — KETOROLAC TROMETHAMINE 30 MG: 30 INJECTION, SOLUTION INTRAMUSCULAR; INTRAVENOUS at 12:23

## 2023-06-01 RX ADMIN — KETOROLAC TROMETHAMINE 30 MG: 30 INJECTION, SOLUTION INTRAMUSCULAR; INTRAVENOUS at 18:12

## 2023-06-01 RX ADMIN — IRON SUCROSE 200 MG: 20 INJECTION, SOLUTION INTRAVENOUS at 05:35

## 2023-06-01 RX ADMIN — SODIUM CHLORIDE, SODIUM LACTATE, POTASSIUM CHLORIDE, AND CALCIUM CHLORIDE 999 ML/HR: .6; .31; .03; .02 INJECTION, SOLUTION INTRAVENOUS at 02:15

## 2023-06-01 RX ADMIN — KETOROLAC TROMETHAMINE 30 MG: 30 INJECTION, SOLUTION INTRAMUSCULAR; INTRAVENOUS at 08:02

## 2023-06-01 NOTE — LACTATION NOTE
This note was copied from a baby's chart  CONSULT - LACTATION  Baby Boy Jahaira Marie 1 days male MRN: [id number]    [facility] NURSERY Room / Bed: L&D 311(N)/L&D 311(N) Encounter: [id number]    Maternal Information     MOTHER:  Kem Rinaldi  Maternal Age: 28 y o    OB History: # 1 - Date: 23, Sex: Male, Weight: 3125 g (6 lb 14 2 oz), GA: 39w2d, Delivery: , Low Transverse, Apgar1: 9, Apgar5: 9, Living: Living, Birth Comments: None   Previouse breast reduction surgery? No    Lactation history:   Has patient previously breast fed: No   How long had patient previously breast fed:     Previous breast feeding complications:       Past Surgical History:   Procedure Laterality Date   • DIAGNOSTIC LAPAROSCOPY      age 12 for endometriosis   • SD  DELIVERY ONLY N/A 2023    Procedure:  SECTION (); Surgeon: Dayo Unger DO;  Location: [facility];  Service: Obstetrics        Birth information:  YOB: 2023   Time of birth: 11:05 AM   Sex: male   Delivery type: , Low Transverse   Birth Weight: 3125 g (6 lb 14 2 oz)   Percent of Weight Change: -2%     Gestational Age: 44w2d   [unfilled]    Assessment     Breast and nipple assessment: normal assessment     Assessment: restricted tongue movement and receded chin    Feeding assessment: no latch  LATCH:  Latch: Too sleepy or reluctant, no latch achieved   Audible Swallowing: None   Type of Nipple: Everted (After stimulation)   Comfort (Breast/Nipple): Soft/non-tender   Hold (Positioning): Partial assist, teach one side, mother does other, staff holds   [facility] Score: 5          Feeding recommendations:    Met with mother  Provided mother with Ready, Set, Baby booklet which contained information on:  Hand expression with access to QR codes to review hand expression  Positioning and latch reviewed as well as showing images of other feeding positions    Discussed the properties of a good latch in any position  Feeding on cue and what that means for recognizing infant's hunger, s/s that baby is getting enough milk and some s/s that breastfeeding dyad may need further help  Skin to Skin contact an benefits to mom and baby  Avoidance of pacifiers for the first month discussed  Gave information on common concerns, what to expect the first few weeks after delivery, preparing for other caregivers, and how partners can help  Resources for support also provided  Parent's state that baby fed well yesterday but today he has been sleepy post circumcision and bath  Worked on positioning infant up at chest level and starting to feed infant with nose arriving at the nipple  Then, using areolar compression to achieve a deep latch that is comfortable and exchanges optimum amounts of milk  I offered suggestions on positioning, for a more optimal latch, showed mom proper positioning, ear, shoulder hip in line, baby's arms open, not in between mom and baby, nose to nipple, hand at base of head/neck  Latch not achieved  Mom was able to hand express colostrum onto a spoon and dad spoon fed baby  I encouraged mom and baby to spend lots of time skin to skin, to continue to offer the breast to meet early feeding cues and to continue to hand express  We discussed the option of a hand pump or electric pump later today if baby is still struggling to latch, may also continue to hand express if effective  I encouraged parents to call to schedule an appt of out patient lactation support  Jane Assessment for Lingual Frenulum Function    Appearance Items Function Items   Appearance of tongue when lifted  1: Slight cleft in tip apparent   Lateralization  0: None   Elasticity of frenulum  0: Little or no elasticity   Lift of tongue  0:  Tip stays at lower alveloar ridge or rises to mid-mouth only with jaw closure     Length of lingual frenulum when tongue lifted  lingual frenulum length: 0: < 1cm     Extension of tongue  1: Tip over lower gum only   Attachment of lingual frenulum to tongue  0: Notched tip   Spread of anterior tongue  2: Complete   Attachment of lingual frenulum to inferior alveolar ridge  0: Notched tip: Attached at ridge Cupping  1: Side edges only, moderate cup   Ankyloglossia Grading:  Class I: mild, 12-16 mm  Class II: moderate, 8-11 mm  Class III: severe, 3-7 mm  ClassIV: complete, less than 3 mm Peristalsis  1: Partial, originating posterior to tip       SCORE:    Appearance: 1 (<8=ankyloglossia)  Function: 2 (<11=ankyloglossia) Snapback  1: Periodic       Yair Mondragon RN 6/1/2023 2:58 PM

## 2023-06-01 NOTE — NURSING NOTE
Low urine output noted in ca catheter  Urine ly in color  Dr Margaux Gordon made aware  Will keep catheter in until AM  Oral hydration encouraged

## 2023-06-01 NOTE — PLAN OF CARE

## 2023-06-01 NOTE — PLAN OF CARE

## 2023-06-01 NOTE — PROGRESS NOTES
Obstetrics Progress Note  Maxime Grimaldo 28 y o  female MRN: [id number]  Unit/Bed#: L&D 311-01 Encounter: [id number]    Assessment/Plan:  Postoperative day #1 s/p primary low transverse  delivery after failed vacuum assisted vaginal delivery  Stable  Baby in room  By issue:  * S/P primary low transverse   Assessment & Plan  Hemoglobin 11 7g/dL --> 9 7g/dL; Venofer ordered  Pain regimen: scheduled Toradol-> Tylenol;scheduled Motrin; PRN Oxycodone  Bowel regimen: Colace BID  Consider VTE chemoprophylaxis with Lovenox 40mg daily  Remove ca catheter and initiate voiding trial  Encourage ambulation  Encourage breastfeeding & pumping      Anxiety  Assessment & Plan  Not on medications  Follow up EPDS    Full-term premature rupture of membranes-resolved as of 2023  Assessment & Plan  Confirmed by Amnisure        Anticipate discharge postop day 2-3  Subjective/Objective   Chief Complaint:   Post delivery  Subjective:   Pain: controlled  Tolerating PO: yes  Voiding: not yet  Flatus: yes  Bowel Movement: no  Ambulating: yes  Chest pain: no  Shortness of breath: no  Leg pain: no  Lochia: within normal limits  Infant feeding: breastmilk  Objective:   Vitals:   Temp:  [97 5 °F (36 4 °C)-100 5 °F (38 1 °C)] 97 5 °F (36 4 °C)  HR:  [] 90  Resp:  [18-20] 18  BP: (106-148)/(55-92) 106/55     Intake/Output Summary (Last 24 hours) at 2023 0456  Last data filed at 2023 0454  Gross per 24 hour   Intake 5717 18 ml   Output 2750 ml   Net 2967 18 ml       Physical Exam:   -General: alert and oriented x 3, in no apparent distress  -Pulmonary: normal effort, no respiratory distress  -Abdomen/Pelvis: soft, non-tender, non-distended, no rebound or guarding  Uterine fundus firm and non-tender, at the umbilicus   Incision: clean, dry and intact  -Extremities: Non-tender, bilateral pedal edema    Lab Results   Component Value Date    HCT 30 4 (L) 2023    HGB 9 7 (L) 2023    MCV 86 05/31/2023     05/31/2023    WBC 15 16 (H) 05/31/2023       Devi De Jesus MD  PGY-III, OBGYN  6/1/2023, 4:56 AM

## 2023-06-02 VITALS
DIASTOLIC BLOOD PRESSURE: 69 MMHG | TEMPERATURE: 98.1 F | SYSTOLIC BLOOD PRESSURE: 118 MMHG | HEIGHT: 62 IN | WEIGHT: 166 LBS | RESPIRATION RATE: 18 BRPM | HEART RATE: 71 BPM | BODY MASS INDEX: 30.55 KG/M2 | OXYGEN SATURATION: 95 %

## 2023-06-02 PROBLEM — N80.9 ENDOMETRIOSIS: Status: RESOLVED | Noted: 2023-05-30 | Resolved: 2023-06-02

## 2023-06-02 PROCEDURE — NC001 PR NO CHARGE: Performed by: OBSTETRICS & GYNECOLOGY

## 2023-06-02 PROCEDURE — 94762 N-INVAS EAR/PLS OXIMTRY CONT: CPT

## 2023-06-02 RX ORDER — DOCUSATE SODIUM 100 MG/1
100 CAPSULE, LIQUID FILLED ORAL 2 TIMES DAILY
Qty: 10 CAPSULE | Refills: 0 | Status: SHIPPED | OUTPATIENT
Start: 2023-06-02

## 2023-06-02 RX ORDER — ACETAMINOPHEN 325 MG/1
650 TABLET ORAL EVERY 6 HOURS SCHEDULED
Qty: 14 TABLET | Refills: 0 | Status: SHIPPED | OUTPATIENT
Start: 2023-06-02 | End: 2023-06-04

## 2023-06-02 RX ORDER — IBUPROFEN 600 MG/1
600 TABLET ORAL EVERY 6 HOURS
Qty: 30 TABLET | Refills: 0 | Status: SHIPPED | OUTPATIENT
Start: 2023-06-02

## 2023-06-02 RX ADMIN — ACETAMINOPHEN 650 MG: 325 TABLET ORAL at 12:59

## 2023-06-02 RX ADMIN — DOCUSATE SODIUM 100 MG: 100 CAPSULE, LIQUID FILLED ORAL at 08:17

## 2023-06-02 RX ADMIN — ACETAMINOPHEN 650 MG: 325 TABLET ORAL at 05:19

## 2023-06-02 RX ADMIN — OXYCODONE HYDROCHLORIDE 5 MG: 5 TABLET ORAL at 02:57

## 2023-06-02 RX ADMIN — IBUPROFEN 600 MG: 600 TABLET ORAL at 08:18

## 2023-06-02 RX ADMIN — IBUPROFEN 600 MG: 600 TABLET ORAL at 00:39

## 2023-06-02 NOTE — LACTATION NOTE
"This note was copied from a baby's chart  Parents Called in earlier at 21  to help with deep latch  Baby with some tongue restriction causing \"heart shaped tongue\"  Encouraged Mom to try football hold to help with sustained deep latch and rocker suckling  Mom chose to start on right breast  Worked on positioning infant up at chest level and starting to feed infant with nose arriving at the nipple  Then, using areolar compression to achieve a deep latch that is comfortable and exchanges optimum amounts of milk  Quickly guided Dyad to deep latch  Stimulated to suck, converting to rocker suckling  Baby maintained latch and suckling but then chomping near end of feed  Latch brocken and nipple not misshapen  Baby burped  Then placed at left breast also using football hold  Guided to deep latch  Stimulated to keep rocker suckling till popped off with relaxed tone  Dad also noted good suckling  Latch score of 8 like next feeding  Parents called back in at this time to help with feeding  Parents hand expressing on to spoon  Encouraged parents to have it ready for feed to keep baby calmer  Helped console baby and position at left breast  Minimal hand over hand guidance this time then backing away to allow Mom to maintain feed and dad to assist  Dad then burped baby with guidance and placed at right breast also using football hold  Parents guided baby to breast and maintained feed with verbal guidance  06/02/23 1315   LATCH Documentation   Latch 2   Audible Swallowing 1   Type of Nipple 2   Comfort (Breast/Nipple) 2   Hold (Positioning) 1   LATCH Score 8   Having latch problems? Yes   Position(s) Used Football   Breasts/Nipples   Left Breast Soft   Right Breast Soft   Left Nipple Everted;Tender   Right Nipple Everted;Tender   Intervention Hand expression; Other (comment)  (guided with positioning & latch)   Breastfeeding Progress Not yet established   Other OB Lactation Tools   Feeding Devices Other " (Comment); Syringe  (Spoon for EBM as pre-feed)   Breast Pump   Pump 3  (has Spectra S2 @ Home)       Parents feeling more confident after this feed and pleased with today's sessions  Hoping for discharge home tonight  Encouraged parents to call for assistance, questions, and concerns about breastfeeding  Extension provided

## 2023-06-02 NOTE — PROGRESS NOTES
Obstetrics Progress Note  Zamzam Manning 28 y o  female MRN: [id number]  Unit/Bed#: L&D 311-01 Encounter: [id number]    Assessment/Plan:  Postoperative day #2 s/p primary low transverse  delivery  Stable  Baby in room  By issue:  * S/P primary low transverse   Assessment & Plan  Pain regimen: scheduled Tylenol;scheduled Motrin; PRN Oxycodone  Bowel regimen: Colace BID  Voiding s/p removal of ca catheter  Encourage ambulation and breastfeeding      Anxiety  Assessment & Plan  Not on medications  Follow up EPDS    Full-term premature rupture of membranes-resolved as of 2023  Assessment & Plan  Confirmed by Amnisure        Anticipate discharge later today  Subjective/Objective   Chief Complaint:   Post delivery  Subjective:   Pain: controlled  Tolerating PO: yes  Voiding: yes  Flatus: yes  Bowel Movement: no  Ambulating: yes  Chest pain: no  Shortness of breath: no  Leg pain: no  Lochia: within normal limits  Infant feeding: breast     Objective:   Vitals:   Temp:  [97 6 °F (36 4 °C)-98 4 °F (36 9 °C)] 97 8 °F (36 6 °C)  HR:  [59-86] 82  Resp:  [14-18] 18  BP: (104-128)/(64-72) 128/65     Intake/Output Summary (Last 24 hours) at 2023 0602  Last data filed at 2023 1230  Gross per 24 hour   Intake --   Output 1200 ml   Net -1200 ml       Physical Exam:   -General: alert and oriented x 3, in no apparent distress  -Cardiovascular: regular rate and rhythm  -Pulmonary: normal effort, CTA bilaterally  -Abdomen/Pelvis: soft, non-tender, non-distended, no rebound or guarding  Uterine fundus firm and non-tender, -1 cm below the umbilicus  Incision: clean, dry and intact  -Extremities: Nontender, pitting edema bilaterally    Lab Results   Component Value Date    HCT 30 4 (L) 2023    HGB 9 7 (L) 2023    MCV 86 2023     2023    WBC 15 16 (H) 2023         Devi Recinos MD  PGY-III, OBGYN  2023, 6:02 AM

## 2023-06-02 NOTE — PLAN OF CARE

## 2023-06-05 PROCEDURE — 88307 TISSUE EXAM BY PATHOLOGIST: CPT | Performed by: PATHOLOGY

## 2023-06-05 NOTE — UTILIZATION REVIEW
"NOTIFICATION OF INPATIENT ADMISSION   MATERNITY/DELIVERY AUTHORIZATION REQUEST   SERVICING FACILITY:   [address] [place] [address] - L&D, , NICU  [address] [facility], [facility], [facility] [address] [facility]  Tax ID: [id number]  NPI: [id number] ATTENDING PROVIDER:  Attending Name and NPI#: Darius Kerby, [place] [[id number]]  Address: [address] [place] [address], [facility], [facility] [address] [facility]  Phone: [phone]     ADMISSION INFORMATION:  Place of Service: Inpatient [address] [facility]  [address]  Place of Service Code: 21  Inpatient Admission Date/Time: 23 11:40 AM  Discharge Date/Time: 2023  3:29 PM  Admitting Diagnosis Code/Description:  Abdominal pain affecting pregnancy [O26 899, R10 9]     Mother: Shantal Phan 1990 Estimated Date of Delivery: 23  Delivering clinician: Jia Merida    OB History        1    Para   1    Term   1       0    AB   0    Living   1       SAB   0    IAB   0    Ectopic   0    Multiple   0    Live Births   1               Dewart Name & MRN:   Information for the patient's :  Kianna Cardoza [[id number]]     Dewart Delivery Information:  Sex: male  Delivered 2023 11:05 AM by , Low Transverse; Gestational Age: 44w2d     Measurements:  Weight: 6 lb 14 2 oz (3125 g); Height: 20 5\"    APGAR 1 minute 5 minutes 10 minutes   Totals: 9 9       Birth Information: 28 y o  female MRN: [id number] Unit/Bed#: L&D 311-01   Birthweight: No birth weight on file  Gestational Age: <None> Delivery Type:    APGARS Totals:        UTILIZATION REVIEW CONTACT:  Adebayo Haq, Utilization   Network Utilization Review Department  Phone: [phone]  Fax [phone]  Email: [email or web address]  Contact for approvals/pending authorizations, clinical reviews, and discharge       PHYSICIAN ADVISORY SERVICES:  Medical Necessity Denial & Iwlc-[email or web address]-Dzvk Review  Phone: [phone]  Fax: [phone]  Email: " [email or web address]

## 2023-06-09 NOTE — UTILIZATION REVIEW
"NOTIFICATION OF INPATIENT ADMISSION   MATERNITY/DELIVERY AUTHORIZATION REQUEST   SERVICING FACILITY:   [address] [place] [address] - L&D, [place], NICU  [address] [facility], [facility], [address] [facility]  Tax ID: [id number]  NPI: [id number] ATTENDING PROVIDER:  Attending Name and NPI#: [id number] [[id number]]  Address: [address] [place] [address], [facility], [facility] [address] [facility]  Phone: [phone]      ADMISSION INFORMATION:  Place of Service: Inpatient [address] [facility]  [address]  Place of Service Code: 21  Inpatient Admission Date/Time: 23 11:40 AM  Discharge Date/Time: 2023  3:29 PM  Admitting Diagnosis Code/Description:  Abdominal pain affecting pregnancy [O26 899, R10 9]      Mother: Violeta Pitts 1990 Estimated Date of Delivery: 23  Delivering clinician: Jia Merida             OB History         1    Para   1    Term   1       0    AB   0    Living   1        SAB   0    IAB   0    Ectopic   0    Multiple   0    Live Births   1                  [place] Name & MRN:   Information for the patient's :  Sawyer Hernandez [[id number]]       Delivery Information:  Sex: male  Delivered 2023 11:05 AM by , Low Transverse; Gestational Age: 44w2d      Measurements:  Weight: 6 lb 14 2 oz (3125 g); Height: 20 5\"     APGAR 1 minute 5 minutes 10 minutes   Totals: 9 9         Birth Information: 28 y o  female MRN: [id number] Unit/Bed#: L&D 311-01   Birthweight: No birth weight on file  Gestational Age: <None> Delivery Type:    APGARS Totals:        UTILIZATION REVIEW CONTACT:  Jamie Patricio Utilization   Network Utilization Review Department  Phone: [phone]  Fax [phone]  Email: [email or web address]  Contact for approvals/pending authorizations, clinical reviews, and discharge       PHYSICIAN ADVISORY SERVICES:  Medical Necessity Denial & [facility] Review  Phone: [phone]  Fax: " [phone]  Email: [email or web address]

## 2023-06-10 ENCOUNTER — HOSPITAL ENCOUNTER (INPATIENT)
Facility: HOSPITAL | Age: 33
LOS: 2 days | Discharge: HOME | End: 2023-06-12
Attending: OBSTETRICS & GYNECOLOGY | Admitting: OBSTETRICS & GYNECOLOGY
Payer: COMMERCIAL

## 2023-06-10 DIAGNOSIS — O14.10 PREECLAMPSIA, SEVERE: Primary | ICD-10-CM

## 2023-06-10 DIAGNOSIS — Z98.891 S/P PRIMARY LOW TRANSVERSE C-SECTION: ICD-10-CM

## 2023-06-10 LAB
ALBUMIN SERPL BCP-MCNC: 3.7 G/DL (ref 3.5–5)
ALP SERPL-CCNC: 71 U/L (ref 34–104)
ALT SERPL W P-5'-P-CCNC: 26 U/L (ref 7–52)
ANION GAP SERPL CALCULATED.3IONS-SCNC: 5 MMOL/L (ref 4–13)
AST SERPL W P-5'-P-CCNC: 22 U/L (ref 13–39)
BUN SERPL-MCNC: 9 MG/DL (ref 5–25)
CALCIUM SERPL-MCNC: 8.8 MG/DL (ref 8.4–10.2)
CHLORIDE SERPL-SCNC: 109 MMOL/L (ref 96–108)
CO2 SERPL-SCNC: 24 MMOL/L (ref 21–32)
CREAT SERPL-MCNC: 0.69 MG/DL (ref 0.6–1.3)
ERYTHROCYTE [DISTWIDTH] IN BLOOD BY AUTOMATED COUNT: 14.6 % (ref 11.6–15.1)
GFR SERPL CREATININE-BSD FRML MDRD: 115 ML/MIN/1.73SQ M
GLUCOSE SERPL-MCNC: 87 MG/DL (ref 65–140)
HCT VFR BLD AUTO: 33.2 % (ref 34.8–46.1)
HGB BLD-MCNC: 10.1 G/DL (ref 11.5–15.4)
MAGNESIUM SERPL-MCNC: 1.8 MG/DL (ref 1.9–2.7)
MCH RBC QN AUTO: 27 PG (ref 26.8–34.3)
MCHC RBC AUTO-ENTMCNC: 30.4 G/DL (ref 31.4–37.4)
MCV RBC AUTO: 89 FL (ref 82–98)
PLATELET # BLD AUTO: 342 THOUSANDS/UL (ref 149–390)
PMV BLD AUTO: 9.4 FL (ref 8.9–12.7)
POTASSIUM SERPL-SCNC: 4 MMOL/L (ref 3.5–5.3)
PROT SERPL-MCNC: 5.9 G/DL (ref 6.4–8.4)
RBC # BLD AUTO: 3.74 MILLION/UL (ref 3.81–5.12)
SODIUM SERPL-SCNC: 138 MMOL/L (ref 135–147)
WBC # BLD AUTO: 6.04 THOUSAND/UL (ref 4.31–10.16)

## 2023-06-10 PROCEDURE — 3E033GC INTRODUCTION OF OTHER THERAPEUTIC SUBSTANCE INTO PERIPHERAL VEIN, PERCUTANEOUS APPROACH: ICD-10-PCS | Performed by: OBSTETRICS & GYNECOLOGY

## 2023-06-10 PROCEDURE — NC001 PR NO CHARGE: Performed by: OBSTETRICS & GYNECOLOGY

## 2023-06-10 PROCEDURE — 83735 ASSAY OF MAGNESIUM: CPT

## 2023-06-10 PROCEDURE — 80053 COMPREHEN METABOLIC PANEL: CPT

## 2023-06-10 PROCEDURE — 85027 COMPLETE CBC AUTOMATED: CPT

## 2023-06-10 RX ORDER — CALCIUM GLUCONATE 94 MG/ML
1 INJECTION, SOLUTION INTRAVENOUS ONCE AS NEEDED
Status: DISCONTINUED | OUTPATIENT
Start: 2023-06-10 | End: 2023-06-12 | Stop reason: HOSPADM

## 2023-06-10 RX ORDER — MAGNESIUM SULFATE HEPTAHYDRATE 40 MG/ML
4 INJECTION, SOLUTION INTRAVENOUS ONCE
Status: COMPLETED | OUTPATIENT
Start: 2023-06-10 | End: 2023-06-10

## 2023-06-10 RX ORDER — LABETALOL 200 MG/1
200 TABLET, FILM COATED ORAL EVERY 12 HOURS SCHEDULED
Status: DISCONTINUED | OUTPATIENT
Start: 2023-06-11 | End: 2023-06-12 | Stop reason: HOSPADM

## 2023-06-10 RX ORDER — LABETALOL 200 MG/1
200 TABLET, FILM COATED ORAL EVERY 12 HOURS SCHEDULED
Status: DISCONTINUED | OUTPATIENT
Start: 2023-06-10 | End: 2023-06-10

## 2023-06-10 RX ORDER — LABETALOL HYDROCHLORIDE 5 MG/ML
40 INJECTION, SOLUTION INTRAVENOUS ONCE
Status: COMPLETED | OUTPATIENT
Start: 2023-06-10 | End: 2023-06-10

## 2023-06-10 RX ORDER — ONDANSETRON 2 MG/ML
4 INJECTION INTRAMUSCULAR; INTRAVENOUS EVERY 6 HOURS PRN
Status: DISCONTINUED | OUTPATIENT
Start: 2023-06-10 | End: 2023-06-12 | Stop reason: HOSPADM

## 2023-06-10 RX ORDER — MAGNESIUM SULFATE HEPTAHYDRATE 40 MG/ML
2 INJECTION, SOLUTION INTRAVENOUS CONTINUOUS
Status: DISCONTINUED | OUTPATIENT
Start: 2023-06-10 | End: 2023-06-12 | Stop reason: HOSPADM

## 2023-06-10 RX ORDER — METOCLOPRAMIDE HYDROCHLORIDE 5 MG/ML
10 INJECTION INTRAMUSCULAR; INTRAVENOUS ONCE
Status: COMPLETED | OUTPATIENT
Start: 2023-06-10 | End: 2023-06-11

## 2023-06-10 RX ORDER — MAGNESIUM SULFATE HEPTAHYDRATE 40 MG/ML
2 INJECTION, SOLUTION INTRAVENOUS ONCE
Status: COMPLETED | OUTPATIENT
Start: 2023-06-10 | End: 2023-06-10

## 2023-06-10 RX ORDER — DIAPER,BRIEF,INFANT-TODD,DISP
1 EACH MISCELLANEOUS DAILY PRN
Status: DISCONTINUED | OUTPATIENT
Start: 2023-06-10 | End: 2023-06-12 | Stop reason: HOSPADM

## 2023-06-10 RX ORDER — LABETALOL HYDROCHLORIDE 5 MG/ML
20 INJECTION, SOLUTION INTRAVENOUS ONCE
Status: COMPLETED | OUTPATIENT
Start: 2023-06-10 | End: 2023-06-10

## 2023-06-10 RX ADMIN — LABETALOL HYDROCHLORIDE 40 MG: 5 INJECTION, SOLUTION INTRAVENOUS at 20:17

## 2023-06-10 RX ADMIN — MAGNESIUM SULFATE HEPTAHYDRATE 2 G/HR: 40 INJECTION, SOLUTION INTRAVENOUS at 20:57

## 2023-06-10 RX ADMIN — MAGNESIUM SULFATE HEPTAHYDRATE 4 G: 40 INJECTION, SOLUTION INTRAVENOUS at 20:25

## 2023-06-10 RX ADMIN — ONDANSETRON 4 MG: 2 INJECTION INTRAMUSCULAR; INTRAVENOUS at 21:03

## 2023-06-10 RX ADMIN — MAGNESIUM SULFATE HEPTAHYDRATE 2 G: 40 INJECTION, SOLUTION INTRAVENOUS at 20:47

## 2023-06-10 RX ADMIN — LABETALOL HYDROCHLORIDE 20 MG: 5 INJECTION, SOLUTION INTRAVENOUS at 19:55

## 2023-06-10 RX ADMIN — SODIUM CHLORIDE, SODIUM LACTATE, POTASSIUM CHLORIDE, AND CALCIUM CHLORIDE 25 ML: .6; .31; .03; .02 INJECTION, SOLUTION INTRAVENOUS at 19:55

## 2023-06-10 NOTE — H&P
H&P Exam - Obstetrics  Elizabet Colunga 28 y o  female MRN: [id number]  Unit/Bed#: L&D 327-01 Encounter: [id number]    Assessment: Elizabet Colunga is a 28 y o  female who presents post op day #20 s/p 1LTCS complicated by preeclampsia now with severe range blood pressures, headache and vision changes admitted for antihypertensive treatment and magnesium infusion  Plan:  Preeclampsia, severe  Assessment & Plan  Sustained, severe range blood pressures on arrival   Magnesium 6 g bolus followed by 2g/hr for seizure prophylaxis   IV antihypertensives per protocol if indicated   CBC/CMP   FEN - regular diet     S/P primary low transverse   Assessment & Plan  Incision C/D/I   Encourage breastfeeding/pumping   Continue tylenol/motrin for pain     Anxiety  Assessment & Plan  Currently not on medication         History of Present Illness   HPI:  Elizabet Colunga is a 28 y o  female who presents with headache, vision changes and elevated blood pressures  Christo Mendoza reports that she has had intermittent headaches for 3 days  She states that these headaches are accompanied by fuzzy vision  She states that her headache is currently 7/10 and that she last took tylenol for it at 1600  She was started on labetalol 100 mg BID  She denies any chest pain or shortness of breath  Review of Systems   Constitutional: Negative  HENT: Negative  Eyes: Positive for visual disturbance  Respiratory: Negative  Cardiovascular: Positive for leg swelling  Gastrointestinal: Negative  Endocrine: Negative  Genitourinary: Negative  Musculoskeletal: Negative  Skin: Negative  Allergic/Immunologic: Negative  Neurological: Positive for headaches  Hematological: Negative  Psychiatric/Behavioral: Negative          Historical Information   Past Medical History:   Diagnosis Date   • Anxiety     previously needing medications   • Endometriosis    • Full-term premature rupture of membranes 2023   • Stomach ulcer      Past Surgical History:   Procedure Laterality Date   • DIAGNOSTIC LAPAROSCOPY      age 12 for endometriosis   • [place]  DELIVERY ONLY N/A 2023    Procedure:  SECTION (); Surgeon: Gary Llanes DO;  Location: [facility];  Service: Obstetrics     OB/GYN History:   Family History   Problem Relation Age of Onset   • Heart disease Mother         pace maker   • Anxiety disorder Father    • Hypertension Father    • Anxiety disorder Sister    • No Known Problems Brother    • Heart disease Maternal Grandmother    • Heart disease Maternal Grandfather    • Parkinsonism Paternal Grandfather    • Dementia Paternal Grandfather      Social History   Social History     Substance and Sexual Activity   Alcohol Use Not Currently    Comment: social     Social History     Substance and Sexual Activity   Drug Use No     Social History     Tobacco Use   Smoking Status Never   Smokeless Tobacco Never       Meds/Allergies   Medications Prior to Admission   Medication   • ALPRAZolam (XANAX) 0 25 mg tablet   • docusate sodium (COLACE) 100 mg capsule   • ibuprofen (MOTRIN) 600 mg tablet   • sertraline (ZOLOFT) 50 mg tablet     No Known Allergies    Objective   /63   Pulse 77   Temp 98 2 °F (36 8 °C) (Oral)   Resp 18   Wt 71 kg (156 lb 8 4 oz)   LMP 2022   SpO2 97%   BMI 28 63 kg/m²       Intake/Output Summary (Last 24 hours) at 6/10/2023 2354  Last data filed at 6/10/2023 2156  Gross per 24 hour   Intake --   Output 900 ml   Net -900 ml       Invasive Devices: Invasive Devices     Peripheral Intravenous Line  Duration           Peripheral IV 06/10/23 Right Antecubital <1 day                Physical Exam  Constitutional:       General: She is not in acute distress  HENT:      Head: Normocephalic and atraumatic  Cardiovascular:      Rate and Rhythm: Normal rate and regular rhythm  Pulmonary:      Effort: Pulmonary effort is normal       Breath sounds: Normal breath sounds  Abdominal:      Palpations: Abdomen is soft  Tenderness: There is no abdominal tenderness  Comments: Pfannenstiel incision C/D/I    Musculoskeletal:      Right lower le+ Edema present  Left lower leg: 3+ Edema present  Comments: Feet and ankles with +3 edema   Shins +2 edema    Skin:     General: Skin is warm and dry  Neurological:      General: No focal deficit present  Mental Status: She is alert  Deep Tendon Reflexes:      Reflex Scores:       Brachioradialis reflexes are 2+ on the right side and 2+ on the left side  Patellar reflexes are 2+ on the right side and 2+ on the left side    Psychiatric:         Mood and Affect: Mood normal          Lab Results:   Admission on 06/10/2023   Component Date Value   • WBC 06/10/2023 6 04    • RBC 06/10/2023 3 74 (L)    • Hemoglobin 06/10/2023 10 1 (L)    • Hematocrit 06/10/2023 33 2 (L)    • MCV 06/10/2023 89    • MCH 06/10/2023 27 0    • MCHC 06/10/2023 30 4 (L)    • RDW 06/10/2023 14 6    • Platelets  342    • MPV 06/10/2023 9 4    • Sodium 06/10/2023 138    • Potassium 06/10/2023 4 0    • Chloride 06/10/2023 109 (H)    • CO2 06/10/2023 24    • ANION GAP 06/10/2023 5    • BUN 06/10/2023 9    • Creatinine 06/10/2023 0 69    • Glucose 06/10/2023 87    • Calcium 06/10/2023 8 8    • AST 06/10/2023 22    • ALT 06/10/2023 26    • Alkaline Phosphatase 06/10/2023 71    • Total Protein 06/10/2023 5 9 (L)    • Albumin 06/10/2023 3 7    • Total Bilirubin 06/10/2023     • eGFR 06/10/2023 115    • Magnesium 06/10/2023 1 8 (L)       Vipul Cardoza MD  6/10/2023  11:54 PM

## 2023-06-11 LAB
ALBUMIN SERPL BCP-MCNC: 3.4 G/DL (ref 3.5–5)
ALBUMIN SERPL BCP-MCNC: 3.6 G/DL (ref 3.5–5)
ALBUMIN SERPL BCP-MCNC: 3.7 G/DL (ref 3.5–5)
ALP SERPL-CCNC: 66 U/L (ref 34–104)
ALP SERPL-CCNC: 67 U/L (ref 34–104)
ALP SERPL-CCNC: 71 U/L (ref 34–104)
ALT SERPL W P-5'-P-CCNC: 26 U/L (ref 7–52)
ALT SERPL W P-5'-P-CCNC: 26 U/L (ref 7–52)
ALT SERPL W P-5'-P-CCNC: 27 U/L (ref 7–52)
ANION GAP SERPL CALCULATED.3IONS-SCNC: 4 MMOL/L (ref 4–13)
ANION GAP SERPL CALCULATED.3IONS-SCNC: 5 MMOL/L (ref 4–13)
ANION GAP SERPL CALCULATED.3IONS-SCNC: 8 MMOL/L (ref 4–13)
AST SERPL W P-5'-P-CCNC: 21 U/L (ref 13–39)
AST SERPL W P-5'-P-CCNC: 23 U/L (ref 13–39)
AST SERPL W P-5'-P-CCNC: 24 U/L (ref 13–39)
BILIRUB SERPL-MCNC: 0.22 MG/DL (ref 0.2–1)
BUN SERPL-MCNC: 6 MG/DL (ref 5–25)
BUN SERPL-MCNC: 6 MG/DL (ref 5–25)
BUN SERPL-MCNC: 7 MG/DL (ref 5–25)
CALCIUM ALBUM COR SERPL-MCNC: 6.8 MG/DL (ref 8.3–10.1)
CALCIUM SERPL-MCNC: 6.3 MG/DL (ref 8.4–10.2)
CALCIUM SERPL-MCNC: 6.9 MG/DL (ref 8.4–10.2)
CALCIUM SERPL-MCNC: 7.7 MG/DL (ref 8.4–10.2)
CHLORIDE SERPL-SCNC: 102 MMOL/L (ref 96–108)
CHLORIDE SERPL-SCNC: 104 MMOL/L (ref 96–108)
CHLORIDE SERPL-SCNC: 107 MMOL/L (ref 96–108)
CO2 SERPL-SCNC: 24 MMOL/L (ref 21–32)
CO2 SERPL-SCNC: 26 MMOL/L (ref 21–32)
CO2 SERPL-SCNC: 26 MMOL/L (ref 21–32)
CREAT SERPL-MCNC: 0.53 MG/DL (ref 0.6–1.3)
CREAT SERPL-MCNC: 0.53 MG/DL (ref 0.6–1.3)
CREAT SERPL-MCNC: 0.56 MG/DL (ref 0.6–1.3)
ERYTHROCYTE [DISTWIDTH] IN BLOOD BY AUTOMATED COUNT: 14.7 % (ref 11.6–15.1)
ERYTHROCYTE [DISTWIDTH] IN BLOOD BY AUTOMATED COUNT: 14.8 % (ref 11.6–15.1)
ERYTHROCYTE [DISTWIDTH] IN BLOOD BY AUTOMATED COUNT: 14.8 % (ref 11.6–15.1)
GFR SERPL CREATININE-BSD FRML MDRD: 123 ML/MIN/1.73SQ M
GFR SERPL CREATININE-BSD FRML MDRD: 126 ML/MIN/1.73SQ M
GFR SERPL CREATININE-BSD FRML MDRD: 126 ML/MIN/1.73SQ M
GLUCOSE SERPL-MCNC: 81 MG/DL (ref 65–140)
GLUCOSE SERPL-MCNC: 82 MG/DL (ref 65–140)
GLUCOSE SERPL-MCNC: 97 MG/DL (ref 65–140)
HCT VFR BLD AUTO: 33 % (ref 34.8–46.1)
HCT VFR BLD AUTO: 34 % (ref 34.8–46.1)
HCT VFR BLD AUTO: 34.4 % (ref 34.8–46.1)
HGB BLD-MCNC: 10.4 G/DL (ref 11.5–15.4)
HGB BLD-MCNC: 10.6 G/DL (ref 11.5–15.4)
HGB BLD-MCNC: 10.6 G/DL (ref 11.5–15.4)
MAGNESIUM SERPL-MCNC: 5.9 MG/DL (ref 1.9–2.7)
MAGNESIUM SERPL-MCNC: 6.5 MG/DL (ref 1.9–2.7)
MAGNESIUM SERPL-MCNC: 6.9 MG/DL (ref 1.9–2.7)
MCH RBC QN AUTO: 27.3 PG (ref 26.8–34.3)
MCH RBC QN AUTO: 27.4 PG (ref 26.8–34.3)
MCH RBC QN AUTO: 27.7 PG (ref 26.8–34.3)
MCHC RBC AUTO-ENTMCNC: 30.8 G/DL (ref 31.4–37.4)
MCHC RBC AUTO-ENTMCNC: 31.2 G/DL (ref 31.4–37.4)
MCHC RBC AUTO-ENTMCNC: 31.5 G/DL (ref 31.4–37.4)
MCV RBC AUTO: 88 FL (ref 82–98)
MCV RBC AUTO: 88 FL (ref 82–98)
MCV RBC AUTO: 89 FL (ref 82–98)
PLATELET # BLD AUTO: 343 THOUSANDS/UL (ref 149–390)
PLATELET # BLD AUTO: 347 THOUSANDS/UL (ref 149–390)
PLATELET # BLD AUTO: 365 THOUSANDS/UL (ref 149–390)
PMV BLD AUTO: 9 FL (ref 8.9–12.7)
PMV BLD AUTO: 9.1 FL (ref 8.9–12.7)
PMV BLD AUTO: 9.2 FL (ref 8.9–12.7)
POTASSIUM SERPL-SCNC: 3.2 MMOL/L (ref 3.5–5.3)
POTASSIUM SERPL-SCNC: 3.7 MMOL/L (ref 3.5–5.3)
POTASSIUM SERPL-SCNC: 3.9 MMOL/L (ref 3.5–5.3)
PROT SERPL-MCNC: 5.6 G/DL (ref 6.4–8.4)
PROT SERPL-MCNC: 6.1 G/DL (ref 6.4–8.4)
PROT SERPL-MCNC: 6.3 G/DL (ref 6.4–8.4)
RBC # BLD AUTO: 3.76 MILLION/UL (ref 3.81–5.12)
RBC # BLD AUTO: 3.87 MILLION/UL (ref 3.81–5.12)
RBC # BLD AUTO: 3.88 MILLION/UL (ref 3.81–5.12)
SODIUM SERPL-SCNC: 133 MMOL/L (ref 135–147)
SODIUM SERPL-SCNC: 134 MMOL/L (ref 135–147)
SODIUM SERPL-SCNC: 139 MMOL/L (ref 135–147)
WBC # BLD AUTO: 6.19 THOUSAND/UL (ref 4.31–10.16)
WBC # BLD AUTO: 6.7 THOUSAND/UL (ref 4.31–10.16)
WBC # BLD AUTO: 7.25 THOUSAND/UL (ref 4.31–10.16)

## 2023-06-11 PROCEDURE — 83735 ASSAY OF MAGNESIUM: CPT | Performed by: OBSTETRICS & GYNECOLOGY

## 2023-06-11 PROCEDURE — 80053 COMPREHEN METABOLIC PANEL: CPT | Performed by: OBSTETRICS & GYNECOLOGY

## 2023-06-11 PROCEDURE — 85027 COMPLETE CBC AUTOMATED: CPT

## 2023-06-11 PROCEDURE — 85027 COMPLETE CBC AUTOMATED: CPT | Performed by: OBSTETRICS & GYNECOLOGY

## 2023-06-11 PROCEDURE — 83735 ASSAY OF MAGNESIUM: CPT

## 2023-06-11 PROCEDURE — 80053 COMPREHEN METABOLIC PANEL: CPT

## 2023-06-11 PROCEDURE — NC001 PR NO CHARGE: Performed by: OBSTETRICS & GYNECOLOGY

## 2023-06-11 RX ORDER — MELATONIN
6000 DAILY
COMMUNITY

## 2023-06-11 RX ORDER — ACETAMINOPHEN 325 MG/1
650 TABLET ORAL EVERY 6 HOURS PRN
Status: DISCONTINUED | OUTPATIENT
Start: 2023-06-11 | End: 2023-06-12 | Stop reason: HOSPADM

## 2023-06-11 RX ORDER — LABETALOL 100 MG/1
100 TABLET, FILM COATED ORAL 2 TIMES DAILY
COMMUNITY
End: 2023-06-12

## 2023-06-11 RX ADMIN — LABETALOL HYDROCHLORIDE 200 MG: 200 TABLET, FILM COATED ORAL at 08:34

## 2023-06-11 RX ADMIN — MAGNESIUM SULFATE HEPTAHYDRATE 2 G/HR: 40 INJECTION, SOLUTION INTRAVENOUS at 06:22

## 2023-06-11 RX ADMIN — ACETAMINOPHEN 650 MG: 325 TABLET ORAL at 16:12

## 2023-06-11 RX ADMIN — LABETALOL HYDROCHLORIDE 200 MG: 200 TABLET, FILM COATED ORAL at 20:52

## 2023-06-11 RX ADMIN — ACETAMINOPHEN 650 MG: 325 TABLET ORAL at 06:28

## 2023-06-11 RX ADMIN — METOCLOPRAMIDE 10 MG: 5 INJECTION, SOLUTION INTRAMUSCULAR; INTRAVENOUS at 08:34

## 2023-06-11 RX ADMIN — ACETAMINOPHEN 650 MG: 325 TABLET ORAL at 22:13

## 2023-06-11 RX ADMIN — MAGNESIUM SULFATE HEPTAHYDRATE 2 G/HR: 40 INJECTION, SOLUTION INTRAVENOUS at 16:11

## 2023-06-11 NOTE — PROGRESS NOTES
Progress Note - OB/GYN  Elie Prader 28 y o  female MRN: [id number]  Unit/Bed#: L&D 327-01 Encounter: [id number]    Assessment and Plan     Elie Prader is a patient of: [facility]  She is POD# 11 s/p Primary  section now readmitted with preE w/ SF  Recovering well and is stable       S/P primary low transverse   Assessment & Plan  Incision C/D/I   Encourage breastfeeding/pumping   Continue tylenol/motrin for pain     Anxiety  Assessment & Plan  Currently not on medication     * Preeclampsia, severe  Assessment & Plan  S/p labetalol 20/40 IV on arrival   Labetalol 200 mg BID PO   Continue magnesium infusion until   Tylenol PRN for headache   Q6H labs all wnl, consider spacing to Q12H today  FEN - regular diet       Disposition    - Anticipate discharge home when blood pressures normalize    Subjective/Objective     Chief Complaint: Postpartum State     Subjective:    Elie Prader is POD#11 from 1LTCS now readmitted with PreE w/ SF  She reports that her headache has improved and is very mild this AM  She denies any chest pain or shortness of breath       Vitals:   /70   Pulse 87   Temp 98 2 °F (36 8 °C) (Oral)   Resp 18   Wt 71 kg (156 lb 8 4 oz)   LMP 2022   SpO2 97%   Breastfeeding Yes   BMI 28 63 kg/m²       Intake/Output Summary (Last 24 hours) at 2023 7862  Last data filed at 2023 0601  Gross per 24 hour   Intake 620 ml   Output 2400 ml   Net -1780 ml       Invasive Devices     Peripheral Intravenous Line  Duration           Peripheral IV 06/10/23 Right Antecubital <1 day                Physical Exam:   GEN: Elie Prader appears well, alert and oriented x 3, pleasant and cooperative   CARDIO: Regular rate   RESP:  Normal effort   ABDOMEN: soft, no tenderness, no distention, Incision C/D/I  EXTREMITIES: non-tender      Labs:     ALT   Date Value Ref Range Status   2023 26 7 - 52 U/L Final     Comment:     Specimen collection should occur prior to Sulfasalazine administration due to the potential for falsely depressed results  06/10/2023 26 7 - 52 U/L Final     Comment:     Specimen collection should occur prior to Sulfasalazine administration due to the potential for falsely depressed results        AST   Date Value Ref Range Status   06/11/2023 21 13 - 39 U/L Final   06/10/2023 22 13 - 39 U/L Final     Creatinine   Date Value Ref Range Status   06/11/2023 0 56 (L) 0 60 - 1 30 mg/dL Final     Comment:     Standardized to IDMS reference method   06/10/2023 0 69 0 60 - 1 30 mg/dL Final     Comment:     Standardized to IDMS reference method     Hemoglobin   Date Value Ref Range Status   06/11/2023 10 6 (L) 11 5 - 15 4 g/dL Final   06/10/2023 10 1 (L) 11 5 - 15 4 g/dL Final     Platelets   Date Value Ref Range Status   06/11/2023 365 149 - 390 Thousands/uL Final   06/10/2023 342 149 - 390 Thousands/uL Final     WBC   Date Value Ref Range Status   06/11/2023 7 25 4 31 - 10 16 Thousand/uL Final   06/10/2023 6 04 4 31 - 10 16 Thousand/uL Final          Roberto Chandler MD  6/11/2023  6:28 AM

## 2023-06-11 NOTE — ASSESSMENT & PLAN NOTE
S/p labetalol 20/40 IV on arrival   Labetalol 200 mg BID PO   S/p magnesium infusion; stopped 6/11 at approx 2672  Systolic (65CJB), QTI:796 , Min:96 , YDF:646   Diastolic (48YKC), YCD:64, Min:53, Max:66      Tylenol PRN for headache   Q6H labs all wnl  FEN - regular diet

## 2023-06-11 NOTE — PLAN OF CARE
Problem: PAIN - ADULT  Goal: Verbalizes/displays adequate comfort level or baseline comfort level  Description: Interventions:  - Encourage patient to monitor pain and request assistance  - Assess pain using appropriate pain scale  - Administer analgesics based on type and severity of pain and evaluate response  - Implement non-pharmacological measures as appropriate and evaluate response  - Consider cultural and social influences on pain and pain management  - Notify physician/advanced practitioner if interventions unsuccessful or patient reports new pain  Outcome: Progressing     Problem: INFECTION - ADULT  Goal: Absence or prevention of progression during hospitalization  Description: INTERVENTIONS:  - Assess and monitor for signs and symptoms of infection  - Monitor lab/diagnostic results  - Monitor all insertion sites, i e  indwelling lines, tubes, and drains  - Monitor endotracheal if appropriate and nasal secretions for changes in amount and color  - [place] appropriate cooling/warming therapies per order  - Administer medications as ordered  - Instruct and encourage patient and family to use good hand hygiene technique  - Identify and instruct in appropriate isolation precautions for identified infection/condition  Outcome: Progressing  Goal: Absence of fever/infection during neutropenic period  Description: INTERVENTIONS:  - Monitor WBC    Outcome: Progressing     Problem: SAFETY ADULT  Goal: Patient will remain free of falls  Description: INTERVENTIONS:  - Educate patient/family on patient safety including physical limitations  - Instruct patient to call for assistance with activity   - Consult OT/PT to assist with strengthening/mobility   - Keep Call bell within reach  - Keep bed low and locked with side rails adjusted as appropriate  - Keep care items and personal belongings within reach  - Initiate and maintain comfort rounds  - Make Fall Risk Sign visible to staff  - Apply yellow socks and bracelet for high fall risk patients  - Consider moving patient to room near nurses station  Outcome: Progressing  Goal: Maintain or return to baseline ADL function  Description: INTERVENTIONS:  -  Assess patient's ability to carry out ADLs; assess patient's baseline for ADL function and identify physical deficits which impact ability to perform ADLs (bathing, care of mouth/teeth, toileting, grooming, dressing, etc )  - Assess/evaluate cause of self-care deficits   - Assess range of motion  - Assess patient's mobility; develop plan if impaired  - Assess patient's need for assistive devices and provide as appropriate  - Encourage maximum independence but intervene and supervise when necessary  - Involve family in performance of ADLs  - Assess for home care needs following discharge   - Consider OT consult to assist with ADL evaluation and planning for discharge  - Provide patient education as appropriate  Outcome: Progressing  Goal: Maintains/Returns to pre admission functional level  Description: INTERVENTIONS:  - Perform BMAT or MOVE assessment daily    - Set and communicate daily mobility goal to care team and patient/family/caregiver  - Collaborate with rehabilitation services on mobility goals if consulted  - Out of bed for toileting  - Record patient progress and toleration of activity level   Outcome: Progressing     Problem: Knowledge Deficit  Goal: Patient/family/caregiver demonstrates understanding of disease process, treatment plan, medications, and discharge instructions  Description: Complete learning assessment and assess knowledge base    Interventions:  - Provide teaching at level of understanding  - Provide teaching via preferred learning methods  Outcome: Progressing     Problem: DISCHARGE PLANNING  Goal: Discharge to home or other facility with appropriate resources  Description: INTERVENTIONS:  - Identify barriers to discharge w/patient and caregiver  - Arrange for needed discharge resources and transportation as appropriate  - Identify discharge learning needs (meds, wound care, etc )  - Arrange for interpretive services to assist at discharge as needed  - Refer to Case Management Department for coordinating discharge planning if the patient needs post-hospital services based on physician/advanced practitioner order or complex needs related to functional status, cognitive ability, or social support system  Outcome: Progressing

## 2023-06-12 VITALS
TEMPERATURE: 97.9 F | WEIGHT: 156.53 LBS | OXYGEN SATURATION: 94 % | SYSTOLIC BLOOD PRESSURE: 110 MMHG | RESPIRATION RATE: 18 BRPM | DIASTOLIC BLOOD PRESSURE: 55 MMHG | HEART RATE: 82 BPM | BODY MASS INDEX: 28.63 KG/M2

## 2023-06-12 LAB
ALBUMIN SERPL BCP-MCNC: 3.6 G/DL (ref 3.5–5)
ALP SERPL-CCNC: 64 U/L (ref 34–104)
ALT SERPL W P-5'-P-CCNC: 26 U/L (ref 7–52)
ANION GAP SERPL CALCULATED.3IONS-SCNC: 5 MMOL/L (ref 4–13)
AST SERPL W P-5'-P-CCNC: 21 U/L (ref 13–39)
BILIRUB SERPL-MCNC: 0.34 MG/DL (ref 0.2–1)
BUN SERPL-MCNC: 6 MG/DL (ref 5–25)
CALCIUM SERPL-MCNC: 7.7 MG/DL (ref 8.4–10.2)
CHLORIDE SERPL-SCNC: 107 MMOL/L (ref 96–108)
CO2 SERPL-SCNC: 25 MMOL/L (ref 21–32)
CREAT SERPL-MCNC: 0.49 MG/DL (ref 0.6–1.3)
ERYTHROCYTE [DISTWIDTH] IN BLOOD BY AUTOMATED COUNT: 15 % (ref 11.6–15.1)
GFR SERPL CREATININE-BSD FRML MDRD: 129 ML/MIN/1.73SQ M
GLUCOSE SERPL-MCNC: 71 MG/DL (ref 65–140)
HCT VFR BLD AUTO: 34 % (ref 34.8–46.1)
HGB BLD-MCNC: 10.6 G/DL (ref 11.5–15.4)
MAGNESIUM SERPL-MCNC: 3.5 MG/DL (ref 1.9–2.7)
MCH RBC QN AUTO: 27.5 PG (ref 26.8–34.3)
MCHC RBC AUTO-ENTMCNC: 31.2 G/DL (ref 31.4–37.4)
MCV RBC AUTO: 88 FL (ref 82–98)
PLATELET # BLD AUTO: 324 THOUSANDS/UL (ref 149–390)
PMV BLD AUTO: 9 FL (ref 8.9–12.7)
POTASSIUM SERPL-SCNC: 4.3 MMOL/L (ref 3.5–5.3)
PROT SERPL-MCNC: 6 G/DL (ref 6.4–8.4)
RBC # BLD AUTO: 3.85 MILLION/UL (ref 3.81–5.12)
SODIUM SERPL-SCNC: 137 MMOL/L (ref 135–147)
WBC # BLD AUTO: 5.66 THOUSAND/UL (ref 4.31–10.16)

## 2023-06-12 PROCEDURE — NC001 PR NO CHARGE: Performed by: OBSTETRICS & GYNECOLOGY

## 2023-06-12 PROCEDURE — 85027 COMPLETE CBC AUTOMATED: CPT | Performed by: OBSTETRICS & GYNECOLOGY

## 2023-06-12 PROCEDURE — G0463 HOSPITAL OUTPT CLINIC VISIT: HCPCS

## 2023-06-12 PROCEDURE — 83735 ASSAY OF MAGNESIUM: CPT | Performed by: OBSTETRICS & GYNECOLOGY

## 2023-06-12 PROCEDURE — 80053 COMPREHEN METABOLIC PANEL: CPT | Performed by: OBSTETRICS & GYNECOLOGY

## 2023-06-12 PROCEDURE — 99215 OFFICE O/P EST HI 40 MIN: CPT

## 2023-06-12 RX ORDER — LABETALOL 100 MG/1
200 TABLET, FILM COATED ORAL EVERY 12 HOURS SCHEDULED
Start: 2023-06-12

## 2023-06-12 RX ORDER — ACETAMINOPHEN 325 MG/1
650 TABLET ORAL EVERY 6 HOURS PRN
Refills: 0
Start: 2023-06-12

## 2023-06-12 RX ADMIN — LABETALOL HYDROCHLORIDE 200 MG: 200 TABLET, FILM COATED ORAL at 09:16

## 2023-06-12 NOTE — PROGRESS NOTES
Progress Note - OB/GYN  Robert Zamudio 28 y o  female MRN: [id number]  Unit/Bed#: L&D 327-01 Encounter: [id number]    Assessment and Plan     Robert Zamudio is a patient of: [facility]  She is POD# 12 s/p Primary  section now readmitted with preE w/ SF  Recovering well and is stable       S/P primary low transverse   Assessment & Plan  Incision C/D/I   Encourage breastfeeding/pumping   Continue tylenol/motrin for pain     Anxiety  Assessment & Plan  Currently not on medication     * Preeclampsia, severe  Assessment & Plan  S/p labetalol 20/40 IV on arrival   Labetalol 200 mg BID PO   S/p magnesium infusion; stopped  at approx 1937  Systolic (07CGR), UDQ:892 , Min:96 , PJU:062   Diastolic (97AHO), SOV:00, Min:53, Max:66      Tylenol PRN for headache   Q6H labs all wnl  FEN - regular diet       Disposition    - Can consider d/c home today when 12-24h s/p magnesium and continued non-severe range pressures  Subjective/Objective     Chief Complaint: Postpartum State/PP PreE w/ SF    Subjective:    Robert Zamudio has no complaints this morning  She has no abd pain, no headaches, no vision changes, no RUQ pain, no chest pain, and no SOB  Patient is currently voiding  She is ambulating  Patient is currently passing flatus and had a bowel movement overnight  She is tolerating PO, and denies nausea or vomitting  Patient denies fever, chills, or calf tenderness  Lochia is minimal  She is recovering well and is stable           Vitals:   /65   Pulse 82   Temp 97 9 °F (36 6 °C) (Oral)   Resp 18   Wt 71 kg (156 lb 8 4 oz)   LMP 2022   SpO2 94%   Breastfeeding Yes   BMI 28 63 kg/m²       Intake/Output Summary (Last 24 hours) at 2023 0700  Last data filed at 2023 1830  Gross per 24 hour   Intake 480 ml   Output 1500 ml   Net -1020 ml       Invasive Devices     Peripheral Intravenous Line  Duration           Peripheral IV 06/10/23 Right Antecubital 1 day                Physical Exam:   GEN: Ubaldo Baugh appears well, alert, pleasant and cooperative   CARDIO: RRR, no murmurs or rubs  RESP:  CTAB, no wheezes or rales  ABDOMEN: soft, no tenderness, no distention, fundus below umbilicus, Incision C/D/I, no erythema/exudate  EXTREMITIES: SCDs on, non tender, no erythema      Labs:     ALT   Date Value Ref Range Status   06/11/2023 26 7 - 52 U/L Final     Comment:     Specimen collection should occur prior to Sulfasalazine administration due to the potential for falsely depressed results  06/11/2023 27 7 - 52 U/L Final     Comment:     Specimen collection should occur prior to Sulfasalazine administration due to the potential for falsely depressed results        AST   Date Value Ref Range Status   06/11/2023 23 13 - 39 U/L Final   06/11/2023 24 13 - 39 U/L Final     Creatinine   Date Value Ref Range Status   06/11/2023 0 53 (L) 0 60 - 1 30 mg/dL Final     Comment:     Standardized to IDMS reference method   06/11/2023 0 53 (L) 0 60 - 1 30 mg/dL Final     Comment:     Standardized to IDMS reference method     Hemoglobin   Date Value Ref Range Status   06/11/2023 10 4 (L) 11 5 - 15 4 g/dL Final   06/11/2023 10 6 (L) 11 5 - 15 4 g/dL Final     Platelets   Date Value Ref Range Status   06/11/2023 343 149 - 390 Thousands/uL Final   06/11/2023 347 149 - 390 Thousands/uL Final     WBC   Date Value Ref Range Status   06/11/2023 6 19 4 31 - 10 16 Thousand/uL Final   06/11/2023 6 70 4 31 - 10 16 Thousand/uL Final          Yin Mina MD  6/12/2023  7:00 AM

## 2023-06-12 NOTE — PLAN OF CARE
Problem: PAIN - ADULT  Goal: Verbalizes/displays adequate comfort level or baseline comfort level  Description: Interventions:  - Encourage patient to monitor pain and request assistance  - Assess pain using appropriate pain scale  - Administer analgesics based on type and severity of pain and evaluate response  - Implement non-pharmacological measures as appropriate and evaluate response  - Consider cultural and social influences on pain and pain management  - Notify physician/advanced practitioner if interventions unsuccessful or patient reports new pain  6/12/2023 0059 by Radha Cheek RN  Outcome: Progressing  6/12/2023 0058 by Radha Cheek RN  Outcome: Progressing     Problem: INFECTION - ADULT  Goal: Absence or prevention of progression during hospitalization  Description: INTERVENTIONS:  - Assess and monitor for signs and symptoms of infection  - Monitor lab/diagnostic results  - Monitor all insertion sites, i e  indwelling lines, tubes, and drains  - Monitor endotracheal if appropriate and nasal secretions for changes in amount and color  - [place] appropriate cooling/warming therapies per order  - Administer medications as ordered  - Instruct and encourage patient and family to use good hand hygiene technique  - Identify and instruct in appropriate isolation precautions for identified infection/condition  6/12/2023 0059 by Radha Cheek RN  Outcome: Progressing  6/12/2023 0058 by Radha Cheek RN  Outcome: Progressing  Goal: Absence of fever/infection during neutropenic period  Description: INTERVENTIONS:  - Monitor WBC    6/12/2023 0059 by Radha Cheek RN  Outcome: Progressing  6/12/2023 0058 by Radha Cheek RN  Outcome: Progressing     Problem: SAFETY ADULT  Goal: Patient will remain free of falls  Description: INTERVENTIONS:  - Educate patient/family on patient safety including physical limitations  - Instruct patient to call for assistance with activity   - Consult OT/PT to assist with strengthening/mobility   - Keep Call bell within reach  - Keep bed low and locked with side rails adjusted as appropriate  - Keep care items and personal belongings within reach  - Initiate and maintain comfort rounds  - Make Fall Risk Sign visible to staff  - Apply yellow socks and bracelet for high fall risk patients  - Consider moving patient to room near nurses station  6/12/2023 0059 by Selvin Gerard RN  Outcome: Progressing  6/12/2023 0058 by Selvin Gerard RN  Outcome: Progressing  Goal: Maintain or return to baseline ADL function  Description: INTERVENTIONS:  -  Assess patient's ability to carry out ADLs; assess patient's baseline for ADL function and identify physical deficits which impact ability to perform ADLs (bathing, care of mouth/teeth, toileting, grooming, dressing, etc )  - Assess/evaluate cause of self-care deficits   - Assess range of motion  - Assess patient's mobility; develop plan if impaired  - Assess patient's need for assistive devices and provide as appropriate  - Encourage maximum independence but intervene and supervise when necessary  - Involve family in performance of ADLs  - Assess for home care needs following discharge   - Consider OT consult to assist with ADL evaluation and planning for discharge  - Provide patient education as appropriate  6/12/2023 0059 by Selvin Gerard RN  Outcome: Progressing  6/12/2023 0058 by Selvin Gerard RN  Outcome: Progressing  Goal: Maintains/Returns to pre admission functional level  Description: INTERVENTIONS:  - Perform BMAT or MOVE assessment daily    - Set and communicate daily mobility goal to care team and patient/family/caregiver     - Collaborate with rehabilitation services on mobility goals if consulted  - Out of bed for toileting  - Record patient progress and toleration of activity level   6/12/2023 0059 by Selvin Gerard RN  Outcome: Progressing  6/12/2023 0058 by Selvin Gerard RN  Outcome: Progressing     Problem: Knowledge Deficit  Goal: Patient/family/caregiver demonstrates understanding of disease process, treatment plan, medications, and discharge instructions  Description: Complete learning assessment and assess knowledge base    Interventions:  - Provide teaching at level of understanding  - Provide teaching via preferred learning methods  6/12/2023 0059 by Farhad Ramirez RN  Outcome: Progressing  6/12/2023 0058 by Farhad Ramirez RN  Outcome: Progressing     Problem: DISCHARGE PLANNING  Goal: Discharge to home or other facility with appropriate resources  Description: INTERVENTIONS:  - Identify barriers to discharge w/patient and caregiver  - Arrange for needed discharge resources and transportation as appropriate  - Identify discharge learning needs (meds, wound care, etc )  - Arrange for interpretive services to assist at discharge as needed  - Refer to Case Management Department for coordinating discharge planning if the patient needs post-hospital services based on physician/advanced practitioner order or complex needs related to functional status, cognitive ability, or social support system  6/12/2023 0059 by Farhad Ramirez RN  Outcome: Progressing  6/12/2023 0058 by Farhad Ramirez RN  Outcome: Progressing

## 2023-06-12 NOTE — PLAN OF CARE
Problem: PAIN - ADULT  Goal: Verbalizes/displays adequate comfort level or baseline comfort level  Description: Interventions:  - Encourage patient to monitor pain and request assistance  - Assess pain using appropriate pain scale  - Administer analgesics based on type and severity of pain and evaluate response  - Implement non-pharmacological measures as appropriate and evaluate response  - Consider cultural and social influences on pain and pain management  - Notify physician/advanced practitioner if interventions unsuccessful or patient reports new pain  Outcome: Progressing     Problem: INFECTION - ADULT  Goal: Absence or prevention of progression during hospitalization  Description: INTERVENTIONS:  - Assess and monitor for signs and symptoms of infection  - Monitor lab/diagnostic results  - Monitor all insertion sites, i e  indwelling lines, tubes, and drains  - Monitor endotracheal if appropriate and nasal secretions for changes in amount and color  - [place] appropriate cooling/warming therapies per order  - Administer medications as ordered  - Instruct and encourage patient and family to use good hand hygiene technique  - Identify and instruct in appropriate isolation precautions for identified infection/condition  Outcome: Progressing  Goal: Absence of fever/infection during neutropenic period  Description: INTERVENTIONS:  - Monitor WBC    Outcome: Progressing     Problem: SAFETY ADULT  Goal: Patient will remain free of falls  Description: INTERVENTIONS:  - Educate patient/family on patient safety including physical limitations  - Instruct patient to call for assistance with activity   - Consult OT/PT to assist with strengthening/mobility   - Keep Call bell within reach  - Keep bed low and locked with side rails adjusted as appropriate  - Keep care items and personal belongings within reach  - Initiate and maintain comfort rounds  - Apply yellow socks and bracelet for high fall risk patients  - Consider moving patient to room near nurses station  Outcome: Progressing  Goal: Maintain or return to baseline ADL function  Description: INTERVENTIONS:  -  Assess patient's ability to carry out ADLs; assess patient's baseline for ADL function and identify physical deficits which impact ability to perform ADLs (bathing, care of mouth/teeth, toileting, grooming, dressing, etc )  - Assess/evaluate cause of self-care deficits   - Assess range of motion  - Assess patient's mobility; develop plan if impaired  - Assess patient's need for assistive devices and provide as appropriate  - Encourage maximum independence but intervene and supervise when necessary  - Involve family in performance of ADLs  - Assess for home care needs following discharge   - Consider OT consult to assist with ADL evaluation and planning for discharge  - Provide patient education as appropriate  Outcome: Progressing    Problem: Knowledge Deficit  Goal: Patient/family/caregiver demonstrates understanding of disease process, treatment plan, medications, and discharge instructions  Description: Complete learning assessment and assess knowledge base    Interventions:  - Provide teaching at level of understanding  - Provide teaching via preferred learning methods  Outcome: Progressing     Problem: DISCHARGE PLANNING  Goal: Discharge to home or other facility with appropriate resources  Description: INTERVENTIONS:  - Identify barriers to discharge w/patient and caregiver  - Arrange for needed discharge resources and transportation as appropriate  - Identify discharge learning needs (meds, wound care, etc )  - Arrange for interpretive services to assist at discharge as needed  - Refer to Case Management Department for coordinating discharge planning if the patient needs post-hospital services based on physician/advanced practitioner order or complex needs related to functional status, cognitive ability, or social support system  Outcome: Progressing

## 2023-06-12 NOTE — UTILIZATION REVIEW
Initial Clinical Review    Admission: Date/Time/Statement:   Admission Orders (From admission, onward)     Ordered        06/10/23 1937  Inpatient Admission  Once                      Orders Placed This Encounter   Procedures   • Inpatient Admission     Standing Status:   Standing     Number of Occurrences:   1     Order Specific Question:   Level of Care     Answer:   Med Surg [16]     Order Specific Question:   Estimated length of stay     Answer:   More than 2 Midnights     Order Specific Question:   Certification     Answer:   I certify that inpatient services are medically necessary for this patient for a duration of greater than two midnights  See H&P and MD Progress Notes for additional information about the patient's course of treatment  ED Arrival Information     Expected   6/10/2023     Arrival   6/10/2023 19:04    Acuity   Emergent            Means of arrival   Walk-In    Escorted by   [facility] Member    Service   OB/GYN    Admission type   Emergency            Arrival complaint   hypertension           Chief Complaint   Patient presents with   • Hypertension     Patient had baby on the 31st  Reports dx with post partum preeclampsia  Reports headaches and dizziness  BP as high as 180/105  Initial Presentation: 28 y o  female [place] 26 / post partum day 10 presents with headache, vision changes and elevated blood pressures  States + intermittent headaches for 3 days, accompanied by fuzzy vision  Reports  headache is currently 7/10 and that she last took tylenol for it at 1600  Inpatient admission w pre eclampsia w SF   EXAM  Sustained SR BPs  PLAN  IV antiHTN PRN , IV bolus MAG with GTT, serial labs & BPs  Pain management; routine post partum care    Date: 6/11/2023    Day 2:   POD# 11   Feeling tired and warm from magnesium  Pumping for baby  Labs normal- will decrease to q 12    Plan to d/c magnesium tonight and monitor blood pressure  S/p labetalol 20/40 IV on arrival ; Labetalol 200 mg BID PO Continue magnesium infusion until 2025  Tylenol PRN for headache , Q6H labs all wnl, consider spacing to Q12H today  6/12/2023  DAY 3   feeling much better today  HA  resolved  Labs have been stable and BPs well controlled on labetalol 200mg BID    She already has this medication at home and will continue until BP check  OP OB Office      Triage Vitals [06/10/23 1911]   Temperature Pulse Respirations Blood Pressure SpO2   98 1 °F (36 7 °C) 62 18 (!) 181/100 98 %      Temp Source Heart Rate Source Patient Position - Orthostatic VS BP Location FiO2 (%)   Oral Monitor Sitting Right arm --      Pain Score       6          Wt Readings from Last 1 Encounters:   06/10/23 71 kg (156 lb 8 4 oz)     Additional Vital Signs:   Date/Time Temp Pulse Resp BP SpO2 O2 Device Cardiac (WDL) Patient Position - Orthostatic VS   06/12/23 0718 -- 72 -- 125/63 -- None (Room air) WDL --   06/12/23 0415 -- 82 -- 128/65 -- -- -- --   06/11/23 2217 -- 68 -- 130/62 -- -- WDL --   06/11/23 2051 -- 72 -- 126/60 -- -- -- --   06/11/23 1830 -- 71 18 111/62 94 % None (Room air) -- Lying   06/11/23 1630 -- 68 18 112/58 96 % None (Room air) -- Lying   06/11/23 1430 97 9 °F (36 6 °C) 77 18 101/57 95 % None (Room air) WDL Lying   06/11/23 1230 -- 67 18 105/58 100 % None (Room air) -- Sitting   06/11/23 1030 -- 65 18 96/53 97 % None (Room air) -- Lying   06/11/23 0834 -- 97 -- 117/66 -- -- -- --   06/11/23 0830 98 7 °F (37 1 °C) 97 18 117/66 94 % None (Room air) WDL Lying   06/11/23 0625 -- 82 18 114/61 -- -- -- --   06/11/23 0612 -- 88 -- -- 94 % None (Room air) -- --   06/11/23 0525 -- 87 -- 137/70 -- -- -- --   06/11/23 0425 -- 77 18 151/70 97 % None (Room air) -- --   06/11/23 0325 -- 76 -- 136/67 -- -- -- --   06/11/23 0255 98 2 °F (36 8 °C) -- -- -- -- -- -- --   06/11/23 0224 -- 78 18 129/63 96 % None (Room air) -- --   06/11/23 0134 -- 88 -- 139/68 -- -- -- --   06/11/23 0026 -- 82 18 123/61 97 % None (Room air) -- --   06/10/23 2356 98 2 °F (36 8 °C) 70 18 137/66 -- -- -- --   06/10/23 2326 -- 93 -- 129/65 -- -- -- --   06/10/23 2315 -- 71 -- 127/70 -- -- -- --   06/10/23 2300 -- 71 -- 127/60 -- -- -- --   06/10/23 2244 -- 77 -- 134/63 -- -- -- --   06/10/23 2234 -- 69 18 136/63 97 % -- -- --   06/10/23 2224 -- 66 -- 148/70 -- -- -- --   06/10/23 2214 -- 77 -- 139/65 -- -- -- --   06/10/23 2204 -- 67 -- 146/68 -- -- -- --   06/10/23 2154 -- 75 -- 124/58 -- -- -- --   06/10/23 2144 -- 71 -- 137/60 -- -- -- --   06/10/23 2131 -- 71 -- 136/69 -- -- -- --   06/10/23 2126 -- 69 -- 157/76 -- -- -- --   06/10/23 2121 -- 68 -- 138/66 -- -- -- --   06/10/23 2116 -- 73 -- 136/68 -- -- -- --   06/10/23 2111 -- 72 -- 143/70 -- -- -- --   06/10/23 2106 -- 75 -- 145/75 -- -- -- --   06/10/23 2101 -- 77 -- 142/71 -- -- -- --   06/10/23 2056 -- 77 -- 149/75 -- -- -- --   06/10/23 2051 -- 78 -- 144/59 -- -- -- --   06/10/23 2046 -- 72 -- 164/72 -- -- -- --   06/10/23 2041 -- 75 -- 157/68 98 % -- -- --   06/10/23 2039 -- 71 -- -- 95 % -- -- --   06/10/23 2036 -- 67 -- 161/72 -- -- -- --   06/10/23 2035 -- 67 -- 161/72 -- -- -- --   06/10/23 2032 -- 65 -- 173/81 Abnormal  -- -- -- --   06/10/23 2030 -- -- -- -- 98 % -- -- --   06/10/23 2021 98 2 °F (36 8 °C) 62 18 168/79 -- -- -- --   06/10/23 2016 -- 60 -- 172/85 Abnormal  -- -- -- --   06/10/23 2014 -- 64 -- 174/80 Abnormal  -- -- -- --   06/10/23 2011 -- 67 -- 169/82 -- None (Room air) WDL --   06/10/23 2006 -- 63 -- 167/77 -- -- -- --   06/10/23 2001 -- 65 -- 168/78 -- -- -- --   06/10/23 2000 -- 65 -- 168/78 -- -- -- --   06/10/23 1911 98 1 °F (36 7 °C) 62 18 181/100 Abnormal  98 % None (Room air) -- Sitting     Weights (last 14 days)    Date/Time Weight Weight Method   06/10/23 1910 71 kg (156 lb 8 4 oz) Standing scale       Pertinent Labs/Diagnostic Test Results:   No orders to display         Results from last 7 days   Lab Units 06/11/23  2024 06/11/23  0823 06/11/23  0227 06/10/23  2031   HEMATOCRIT % 33 0* "34 0* 34 4* 33 2*   HEMOGLOBIN g/dL 10 4* 10 6* 10 6* 10 1*   PLATELETS Thousands/uL 343 347 365 342   WBC Thousand/uL 6 19 6 70 7 25 6 04         Results from last 7 days   Lab Units 06/11/23  2024 06/11/23  0823 06/11/23  0227 06/10/23  2031   ANION GAP mmol/L 5 4 8 5   BUN mg/dL 7 6 6 9   CALCIUM mg/dL 6 3* 6 9* 7 7* 8 8   CHLORIDE mmol/L 102 104 107 109*   CO2 mmol/L 26 26 24 24   CREATININE mg/dL 0 53* 0 53* 0 56* 0 69   EGFR ml/min/1 73sq m 126 126 123 115   POTASSIUM mmol/L 3 2* 3 9 3 7 4 0   MAGNESIUM mg/dL 6 9* 6 5* 5 9* 1 8*   SODIUM mmol/L 133* 134* 139 138     Results from last 7 days   Lab Units 06/11/23  2024 06/11/23  0823 06/11/23  0227 06/10/23  2031   ALBUMIN g/dL 3 4* 3 6 3 7 3 7   ALK PHOS U/L 66 67 71 71   ALT U/L 26 27 26 26   AST U/L 23 24 21 22   TOTAL BILIRUBIN mg/dL 0 22  --   --   --    TOTAL PROTEIN g/dL 5 6* 6 1* 6 3* 5 9*         Results from last 7 days   Lab Units 06/11/23  2024 06/11/23  0823 06/11/23  0227 06/10/23  2031   GLUCOSE RANDOM mg/dL 97 81 82 87             No results found for: \"BETA-HYDROXYBUTYRATE\"                               ED Treatment:   Medication Administration from 06/10/2023 1904 to 06/12/2023 0723       Date/Time Order Dose Route Action     06/10/2023 2025 EDT magnesium sulfate 4 g/100 mL IVPB (premix) 4 g 4 g Intravenous New Bag     06/10/2023 2047 EDT magnesium sulfate 2 g/50 mL IVPB (premix) 2 g 2 g Intravenous New Bag     06/11/2023 1611 EDT magnesium sulfate 20 g/500 mL infusion (premix) 2 g/hr Intravenous New Bag     06/11/2023 0622 EDT magnesium sulfate 20 g/500 mL infusion (premix) 2 g/hr Intravenous New Bag     06/10/2023 2057 EDT magnesium sulfate 20 g/500 mL infusion (premix) 2 g/hr Intravenous New Bag     06/10/2023 1955 EDT labetalol (NORMODYNE) injection 20 mg 20 mg Intravenous Given     06/10/2023 2017 EDT labetalol (NORMODYNE) injection 40 mg 40 mg Intravenous Given     06/11/2023 0834 EDT metoclopramide (REGLAN) injection 10 mg 10 mg " Intravenous Given     06/10/2023 1955 EDT lactated ringers bolus 25 mL 25 mL Intravenous New Bag     06/10/2023 2103 EDT ondansetron (ZOFRAN) injection 4 mg 4 mg Intravenous Given     06/11/2023 2052 EDT labetalol (NORMODYNE) tablet 200 mg 200 mg Oral Given     06/11/2023 0834 EDT labetalol (NORMODYNE) tablet 200 mg 200 mg Oral Given     06/11/2023 2213 EDT acetaminophen (TYLENOL) tablet 650 mg 650 mg Oral Given     06/11/2023 1612 EDT acetaminophen (TYLENOL) tablet 650 mg 650 mg Oral Given     06/11/2023 7980 EDT acetaminophen (TYLENOL) tablet 650 mg 650 mg Oral Given        Past Medical History:   Diagnosis Date   • Anxiety     previously needing medications   • Endometriosis    • Full-term premature rupture of membranes 05/30/2023   • Hypertension    • Stomach ulcer 2013     Present on Admission:  • Anxiety      Admitting Diagnosis: Preeclampsia [O14 90]  Age/Sex: 28 y o  female  Admission Orders:  Vitals, pulse oximetry, auscultate breath sounds, reflexes, clonus checks Q 2hr while on IV MAG  SCD    Scheduled Medications:  labetalol, 200 mg, Oral, Q12H SELINA  lactated ringers, 25 mL, Intravenous, Once    IV Bolus =   labetalol (NORMODYNE) injection 20 mg  Dose: 20 mg  Freq: Once Route: IV  Start: 06/10/23 1945 End: 06/10/23 1955   Admin Instructions:   Administer slowly at rate of 10 mg per minute  Hold for heart rate less than 50 beats per minute  LOOK ALIKE SOUND ALIKE MED    1955 6/10          labetalol (NORMODYNE) injection 40 mg  Dose: 40 mg  Freq: Once Route: IV    2017  6/10       IV BOLUS=     magnesium sulfate 4 g/100 mL IVPB (premix) 4 g  Dose: 4 g  Freq: Once Route: IV  Last Dose: Stopped (06/10/23 2046)  Start: 06/10/23 1945 End: 06/10/23 2046    2025 6/10     2046        Followed by  magnesium sulfate 2 g/50 mL IVPB (premix) 2 g  Dose: 2 g  Freq:  Once Route: IV         Continuous IV Infusions:  magnesium sulfate, 2 g/hr, Intravenous, Continuous      PRN Meds:  acetaminophen, 650 mg, Oral, Q6H PRN  calcium gluconate, 1 g, Intravenous, Once PRN  hydrocortisone, 1 application  , Topical, Daily PRN  ondansetron, 4 mg, Intravenous, Q6H PRN  witch hazel-glycerin, 1 pad , Topical, Q4H PRN    Network Utilization Review Department  ATTENTION: Please call with any questions or concerns to [phone] and carefully listen to the prompts so that you are directed to the right person  All voicemails are confidential   Kelley Perez all requests for admission clinical reviews, approved or denied determinations and any other requests to dedicated fax number below belonging to the campus where the patient is receiving treatment   List of dedicated fax numbers for the Facilities:  [address] DENIALS (Administrative/Medical Necessity) [phone]   [address] (Maternity/NICU/Pediatrics) [phone]   91 Deb Hammer [phone]   Timmylarry Yoon 77 [phone]   [address] [address] Riley Baker 28 [phone]   1553 [facility] Micheal Vega [facility] 134 815 [facility] [phone]

## 2023-06-12 NOTE — UTILIZATION REVIEW
NOTIFICATION OF INPATIENT ADMISSION   Medical Admission/Maternity Unit   SERVICING FACILITY:   [address] [place] [address] - L&D, [place], NICU  [address] [place] [address] [facility]  Tax ID: [id number]  NPI: [id number]   ATTENDING PROVIDER:  Attending Name and NPI#: Franchesca Harrison [[id number]]  Address: [address] [place] [address]  Phone: [phone]     ADMISSION INFORMATION:  [facility]patient [facility]  Place of Service Code: 21  Inpatient Admission Date/Time: 6/10/23  7:37 PM  Discharge Date/Time: No discharge date for patient encounter  Admitting Diagnosis Code/Description:  Preeclampsia [O14 90]     UTILIZATION REVIEW CONTACT:  Hamida Cm Utilization   Network Utilization Review Department  Phone: [phone]  Fax [phone]  Email: [email or web address]  Contact for approvals/pending authorizations, clinical reviews, and discharge  PHYSICIAN ADVISORY SERVICES:  Medical Necessity Denial & Ompq-vk-Gjnk Review  Phone: [phone]  Fax: [phone]  Email: [email or web address]

## 2023-06-13 NOTE — UTILIZATION REVIEW
Annie Alvares  1990   Member ID [id number]    Inpatient admission 2023 1140      Required C section due to Categ 2 FHT w fetal tachycardia; failed vacuum delivery   Primary  delivery 2023 @ 1105 apgars  9 & 9; Birthwt= 3125 GM 6LB 14 2 OZ   DC  2023 1529      ICD Codes:      S/P  primary low transverse C section  Z98 891    Failed vacuum extraction delivery O66 5    Fetal tachycardia affecting management of Mother Z54 8310

## 2023-06-14 NOTE — DISCHARGE SUMMARY
Discharge Summary - OB/GYN  Violeta Pitts 28 y o  female MRN: [id number]  Unit/Bed#: L&D 327-01 Encounter: [id number]    Admission Date: 6/10/2023     Discharge Date: 23    Admitting Attending: Dr Bernardo Davila    Discharging Attending: Dr Don Vela    Diagnosis:   1  1LTCS after pregnancy at 39w2d  2  Anxiety  3  Preeclampsia with severe features    Hospital course: Violeta Pitts is a 28 y o   who was initially admitted between 23 and 23 for primary  section after presentation for prelabor ROM  Her postoperative course was unremarkable, and she was discharged on post-operative day #2  She re-presented to the hospital on 6/10/23 with headaches and vision changes and was found to have severe-range blood pressures requiring acute treatment with IV labetalol  She was diagnosed with preeclampsia with severe features and given 24 hours of magnesium  She was started on PO labetalol for management outpatient  She was discharged after being normotensive for >12 hours after magnesium discontinuation  On day of discharge, she was ambulating and able to reasonably perform all ADLs  She was voiding and had appropriate bowel function  Pain was well controlled  She was discharged home on hospital day #3 without complications  Patient was instructed to follow up with her OB as an outpatient and was given appropriate warnings to call provider if she develops signs of infection, uncontrolled pain, worsening headaches, vision changes, chest pain, shortness of breath, or RUQ pain  Complications: none apparent    Condition at discharge: good     Discharge instructions/medications/information to patient and family:   See after visit summary for information provided to patient and family  Provisions for Follow-Up Care:  See after visit summary for information related to follow-up care and any pertinent home health orders        Disposition: See After Visit Summary for discharge disposition information      Planned Readmission: No

## 2025-06-11 ENCOUNTER — APPOINTMENT (OUTPATIENT)
Age: 35
End: 2025-06-11
Payer: COMMERCIAL

## 2025-06-11 ENCOUNTER — TRANSCRIBE ORDERS (OUTPATIENT)
Age: 35
End: 2025-06-11

## 2025-06-11 DIAGNOSIS — Z36.5 ANTENATAL SCREENING FOR ISOIMMUNIZATION: ICD-10-CM

## 2025-06-11 DIAGNOSIS — Z32.01 PREGNANCY EXAMINATION OR TEST, POSITIVE RESULT: Primary | ICD-10-CM

## 2025-06-11 DIAGNOSIS — Z32.01 PREGNANCY EXAMINATION OR TEST, POSITIVE RESULT: ICD-10-CM

## 2025-06-11 DIAGNOSIS — Z36.89 SCREENING FOR PREGNANCY-ASSOCIATED PLASMA PROTEIN A: ICD-10-CM

## 2025-06-11 LAB — B-HCG SERPL-ACNC: ABNORMAL MIU/ML (ref 0–5)

## 2025-06-11 PROCEDURE — 36415 COLL VENOUS BLD VENIPUNCTURE: CPT

## 2025-06-11 PROCEDURE — 84702 CHORIONIC GONADOTROPIN TEST: CPT

## 2025-07-09 ENCOUNTER — APPOINTMENT (OUTPATIENT)
Age: 35
End: 2025-07-09
Payer: COMMERCIAL

## 2025-07-09 DIAGNOSIS — Z36.5 ANTENATAL SCREENING FOR ISOIMMUNIZATION: ICD-10-CM

## 2025-07-09 DIAGNOSIS — Z36.89 SCREENING FOR PREGNANCY-ASSOCIATED PLASMA PROTEIN A: ICD-10-CM

## 2025-07-09 DIAGNOSIS — Z32.01 PREGNANCY EXAMINATION OR TEST, POSITIVE RESULT: ICD-10-CM

## 2025-07-09 LAB
ABO GROUP BLD: NORMAL
ALBUMIN SERPL BCG-MCNC: 4.2 G/DL (ref 3.5–5)
ALP SERPL-CCNC: 41 U/L (ref 34–104)
ALT SERPL W P-5'-P-CCNC: 15 U/L (ref 7–52)
ANION GAP SERPL CALCULATED.3IONS-SCNC: 9 MMOL/L (ref 4–13)
AST SERPL W P-5'-P-CCNC: 19 U/L (ref 13–39)
BASOPHILS # BLD AUTO: 0.02 THOUSANDS/ÂΜL (ref 0–0.1)
BASOPHILS NFR BLD AUTO: 0 % (ref 0–1)
BILIRUB SERPL-MCNC: 0.4 MG/DL (ref 0.2–1)
BLD GP AB SCN SERPL QL: NEGATIVE
BUN SERPL-MCNC: 7 MG/DL (ref 5–25)
CALCIUM SERPL-MCNC: 9.4 MG/DL (ref 8.4–10.2)
CHLORIDE SERPL-SCNC: 101 MMOL/L (ref 96–108)
CO2 SERPL-SCNC: 24 MMOL/L (ref 21–32)
CREAT SERPL-MCNC: 0.53 MG/DL (ref 0.6–1.3)
CREAT UR-MCNC: 7.9 MG/DL
EOSINOPHIL # BLD AUTO: 0.01 THOUSAND/ÂΜL (ref 0–0.61)
EOSINOPHIL NFR BLD AUTO: 0 % (ref 0–6)
ERYTHROCYTE [DISTWIDTH] IN BLOOD BY AUTOMATED COUNT: 12.1 % (ref 11.6–15.1)
GFR SERPL CREATININE-BSD FRML MDRD: 124 ML/MIN/1.73SQ M
GLUCOSE 1H P 50 G GLC PO SERPL-MCNC: 135 MG/DL (ref 70–134)
GLUCOSE P FAST SERPL-MCNC: 85 MG/DL (ref 65–99)
HBV SURFACE AG SER QL: NORMAL
HCT VFR BLD AUTO: 37.8 % (ref 34.8–46.1)
HCV AB SER QL: NORMAL
HGB BLD-MCNC: 12.6 G/DL (ref 11.5–15.4)
HIV 1+2 AB+HIV1 P24 AG SERPL QL IA: NORMAL
IMM GRANULOCYTES # BLD AUTO: 0.01 THOUSAND/UL (ref 0–0.2)
IMM GRANULOCYTES NFR BLD AUTO: 0 % (ref 0–2)
LYMPHOCYTES # BLD AUTO: 1 THOUSANDS/ÂΜL (ref 0.6–4.47)
LYMPHOCYTES NFR BLD AUTO: 19 % (ref 14–44)
MCH RBC QN AUTO: 28.3 PG (ref 26.8–34.3)
MCHC RBC AUTO-ENTMCNC: 33.3 G/DL (ref 31.4–37.4)
MCV RBC AUTO: 85 FL (ref 82–98)
MONOCYTES # BLD AUTO: 0.24 THOUSAND/ÂΜL (ref 0.17–1.22)
MONOCYTES NFR BLD AUTO: 5 % (ref 4–12)
NEUTROPHILS # BLD AUTO: 3.97 THOUSANDS/ÂΜL (ref 1.85–7.62)
NEUTS SEG NFR BLD AUTO: 76 % (ref 43–75)
NRBC BLD AUTO-RTO: 0 /100 WBCS
PLATELET # BLD AUTO: 279 THOUSANDS/UL (ref 149–390)
PMV BLD AUTO: 10.4 FL (ref 8.9–12.7)
POTASSIUM SERPL-SCNC: 4 MMOL/L (ref 3.5–5.3)
PROT SERPL-MCNC: 6.9 G/DL (ref 6.4–8.4)
PROT UR-MCNC: <4 MG/DL
RBC # BLD AUTO: 4.46 MILLION/UL (ref 3.81–5.12)
RH BLD: POSITIVE
RUBV IGG SERPL IA-ACNC: 71.4 IU/ML
SODIUM SERPL-SCNC: 134 MMOL/L (ref 135–147)
SPECIMEN EXPIRATION DATE: NORMAL
TREPONEMA PALLIDUM IGG+IGM AB [PRESENCE] IN SERUM OR PLASMA BY IMMUNOASSAY: NORMAL
WBC # BLD AUTO: 5.25 THOUSAND/UL (ref 4.31–10.16)

## 2025-07-09 PROCEDURE — 84156 ASSAY OF PROTEIN URINE: CPT

## 2025-07-09 PROCEDURE — 85025 COMPLETE CBC W/AUTO DIFF WBC: CPT

## 2025-07-09 PROCEDURE — 86762 RUBELLA ANTIBODY: CPT

## 2025-07-09 PROCEDURE — 86900 BLOOD TYPING SEROLOGIC ABO: CPT

## 2025-07-09 PROCEDURE — 86780 TREPONEMA PALLIDUM: CPT

## 2025-07-09 PROCEDURE — 87389 HIV-1 AG W/HIV-1&-2 AB AG IA: CPT

## 2025-07-09 PROCEDURE — 80307 DRUG TEST PRSMV CHEM ANLYZR: CPT

## 2025-07-09 PROCEDURE — 80053 COMPREHEN METABOLIC PANEL: CPT

## 2025-07-09 PROCEDURE — 82570 ASSAY OF URINE CREATININE: CPT

## 2025-07-09 PROCEDURE — 82950 GLUCOSE TEST: CPT

## 2025-07-09 PROCEDURE — 87086 URINE CULTURE/COLONY COUNT: CPT

## 2025-07-09 PROCEDURE — 36415 COLL VENOUS BLD VENIPUNCTURE: CPT

## 2025-07-09 PROCEDURE — 86901 BLOOD TYPING SEROLOGIC RH(D): CPT

## 2025-07-09 PROCEDURE — 86803 HEPATITIS C AB TEST: CPT

## 2025-07-09 PROCEDURE — 86850 RBC ANTIBODY SCREEN: CPT

## 2025-07-09 PROCEDURE — 87340 HEPATITIS B SURFACE AG IA: CPT

## 2025-07-09 PROCEDURE — 86787 VARICELLA-ZOSTER ANTIBODY: CPT

## 2025-07-10 LAB
VZV IGG SER QL IA: 20.1 S/CO
VZV IGG SER QL IA: POSITIVE

## 2025-07-11 LAB — BACTERIA UR CULT: NORMAL

## 2025-07-13 LAB
6-ACETYLMORPHINE IA: NEGATIVE NG/ML
ACCEPTABLE CREAT UR QL: 8 MG/DL
AMPHET UR QL SCN: NEGATIVE NG/ML
BARBITURATES UR QL SCN: NEGATIVE NG/ML
BENZODIAZ UR QL SCN: NEGATIVE NG/ML
BUPRENORPHINE UR QL CFM: NEGATIVE NG/ML
CANNABINOIDS UR QL SCN: NEGATIVE NG/ML
CARISOPRODOL UR QL: NEGATIVE NG/ML
COCAINE+BZE UR QL SCN: NEGATIVE NG/ML
ETHYL GLUCURONIDE UR QL SCN: NEGATIVE NG/ML
FENTANYL UR QL SCN: NEGATIVE NG/ML
GABAPENTIN SERPLBLD QL SCN: NEGATIVE UG/ML
METHADONE UR QL SCN: NEGATIVE NG/ML
NITRITE UR QL STRIP: NEGATIVE UG/ML
OPIATES UR QL SCN: NEGATIVE NG/ML
OXYCODONE+OXYMORPHONE UR QL SCN: NEGATIVE NG/ML
PCP UR QL SCN: NEGATIVE NG/ML
PROPOXYPH UR QL SCN: NEGATIVE NG/ML
SPECIMEN PH ACCEPTABLE UR: 7.3 (ref 4.5–8.9)
TAPENTADOL UR QL SCN: NEGATIVE NG/ML
TRAMADOL UR QL SCN: NEGATIVE NG/ML

## 2025-07-14 ENCOUNTER — TRANSCRIBE ORDERS (OUTPATIENT)
Facility: HOSPITAL | Age: 35
End: 2025-07-14

## 2025-07-14 DIAGNOSIS — Z36.0 ENCOUNTER FOR ANTENATAL SCREENING FOR CHROMOSOMAL ANOMALIES: Primary | ICD-10-CM

## 2025-07-16 ENCOUNTER — TELEPHONE (OUTPATIENT)
Age: 35
End: 2025-07-16

## 2025-07-16 NOTE — TELEPHONE ENCOUNTER
Patient returned call states she has her nuchal us scheduled for 7/17/25 with [facility], patient declined genetic counseling. Patient is scheduled [facility] detailed [facility]11[facility]25 [facility]

## 2025-07-22 ENCOUNTER — APPOINTMENT (OUTPATIENT)
Age: 35
End: 2025-07-22
Payer: COMMERCIAL

## 2025-07-22 DIAGNOSIS — O99.810 ABNORMAL GLUCOSE TOLERANCE TEST IN PREGNANCY: ICD-10-CM

## 2025-07-22 LAB
GLUCOSE 1H P 100 G GLC PO SERPL-MCNC: 170 MG/DL (ref 65–179)
GLUCOSE 2H P 100 G GLC PO SERPL-MCNC: 126 MG/DL (ref 65–154)
GLUCOSE 3H P 100 G GLC PO SERPL-MCNC: 90 MG/DL (ref 65–139)
GLUCOSE P FAST SERPL-MCNC: 86 MG/DL (ref 65–94)

## 2025-07-22 PROCEDURE — 36415 COLL VENOUS BLD VENIPUNCTURE: CPT

## 2025-07-22 PROCEDURE — 82952 GTT-ADDED SAMPLES: CPT

## 2025-07-22 PROCEDURE — 82951 GLUCOSE TOLERANCE TEST (GTT): CPT

## 2025-08-14 ENCOUNTER — APPOINTMENT (OUTPATIENT)
Age: 35
End: 2025-08-14
Payer: COMMERCIAL

## 2025-08-14 ENCOUNTER — TRANSCRIBE ORDERS (OUTPATIENT)
Age: 35
End: 2025-08-14

## (undated) DEVICE — GLOVE SRG BIOGEL ECLIPSE 7

## (undated) DEVICE — GLOVE INDICATOR PI UNDERGLOVE SZ 7 BLUE

## (undated) DEVICE — ABG MICROSTICKS SAFETY

## (undated) DEVICE — PACK C-SECTION PBDS

## (undated) DEVICE — SUT VICRYL 0 CTX 36 IN J978H

## (undated) DEVICE — SUT VICRYL 0 CT-1 36 IN J946H

## (undated) DEVICE — SUT PLAIN 3-0 CT-1 27 IN 842H

## (undated) DEVICE — SUT MONOCRYL 4-0 PS-2 27 IN Y426H

## (undated) DEVICE — CHLORAPREP HI-LITE 26ML ORANGE